# Patient Record
Sex: FEMALE | Race: OTHER | NOT HISPANIC OR LATINO | URBAN - METROPOLITAN AREA
[De-identification: names, ages, dates, MRNs, and addresses within clinical notes are randomized per-mention and may not be internally consistent; named-entity substitution may affect disease eponyms.]

---

## 2021-09-03 ENCOUNTER — IMMUNIZATIONS (OUTPATIENT)
Dept: FAMILY MEDICINE CLINIC | Facility: HOSPITAL | Age: 51
End: 2021-09-03

## 2021-09-03 DIAGNOSIS — Z23 ENCOUNTER FOR IMMUNIZATION: Primary | ICD-10-CM

## 2021-09-03 PROCEDURE — 0001A SARS-COV-2 / COVID-19 MRNA VACCINE (PFIZER-BIONTECH) 30 MCG: CPT

## 2021-09-03 PROCEDURE — 91300 SARS-COV-2 / COVID-19 MRNA VACCINE (PFIZER-BIONTECH) 30 MCG: CPT

## 2021-09-24 ENCOUNTER — IMMUNIZATIONS (OUTPATIENT)
Dept: FAMILY MEDICINE CLINIC | Facility: HOSPITAL | Age: 51
End: 2021-09-24

## 2021-09-24 DIAGNOSIS — Z23 ENCOUNTER FOR IMMUNIZATION: Primary | ICD-10-CM

## 2021-09-24 PROCEDURE — 91300 SARS-COV-2 / COVID-19 MRNA VACCINE (PFIZER-BIONTECH) 30 MCG: CPT

## 2021-09-24 PROCEDURE — 0002A SARS-COV-2 / COVID-19 MRNA VACCINE (PFIZER-BIONTECH) 30 MCG: CPT

## 2024-01-03 ENCOUNTER — OFFICE VISIT (OUTPATIENT)
Dept: URGENT CARE | Facility: CLINIC | Age: 54
End: 2024-01-03
Payer: COMMERCIAL

## 2024-01-03 VITALS
SYSTOLIC BLOOD PRESSURE: 124 MMHG | TEMPERATURE: 98.4 F | HEART RATE: 76 BPM | RESPIRATION RATE: 18 BRPM | OXYGEN SATURATION: 98 % | DIASTOLIC BLOOD PRESSURE: 78 MMHG

## 2024-01-03 DIAGNOSIS — B96.89 ACUTE BACTERIAL SINUSITIS: Primary | ICD-10-CM

## 2024-01-03 DIAGNOSIS — J01.90 ACUTE BACTERIAL SINUSITIS: Primary | ICD-10-CM

## 2024-01-03 PROBLEM — I48.0 PAROXYSMAL ATRIAL FIBRILLATION (HCC): Status: ACTIVE | Noted: 2022-07-14

## 2024-01-03 PROBLEM — I10 ESSENTIAL HYPERTENSION: Status: ACTIVE | Noted: 2022-07-14

## 2024-01-03 PROBLEM — Z82.49 FAMILY HISTORY OF CHRONIC HEART FAILURE: Status: ACTIVE | Noted: 2022-11-30

## 2024-01-03 PROBLEM — E66.9 OBESITY: Status: ACTIVE | Noted: 2022-07-14

## 2024-01-03 PROBLEM — R93.1 REGIONAL WALL MOTION ABNORMALITY OF HEART: Status: ACTIVE | Noted: 2022-11-30

## 2024-01-03 PROBLEM — G47.33 OBSTRUCTIVE SLEEP APNEA SYNDROME: Status: ACTIVE | Noted: 2023-02-28

## 2024-01-03 PROBLEM — I27.20 MILD PULMONARY HYPERTENSION (HCC): Status: ACTIVE | Noted: 2022-11-30

## 2024-01-03 PROBLEM — I67.848 VASOSPASM OF CEREBRAL ARTERY: Status: ACTIVE | Noted: 2022-06-26

## 2024-01-03 PROBLEM — I62.9 INTRACRANIAL BLEEDING (HCC): Status: ACTIVE | Noted: 2022-06-16

## 2024-01-03 PROBLEM — I60.9 SUBARACHNOID HEMORRHAGE (HCC): Status: ACTIVE | Noted: 2022-06-26

## 2024-01-03 PROCEDURE — G0382 LEV 3 HOSP TYPE B ED VISIT: HCPCS

## 2024-01-03 RX ORDER — TICAGRELOR 90 MG/1
1 TABLET ORAL 2 TIMES DAILY
COMMUNITY
Start: 2023-09-17 | End: 2024-03-15

## 2024-01-03 RX ORDER — BISOPROLOL FUMARATE AND HYDROCHLOROTHIAZIDE 5; 6.25 MG/1; MG/1
1 TABLET ORAL EVERY MORNING
COMMUNITY
Start: 2023-12-07

## 2024-01-03 RX ORDER — ATORVASTATIN CALCIUM 20 MG/1
TABLET, FILM COATED ORAL
COMMUNITY
Start: 2023-12-09

## 2024-01-03 RX ORDER — AMLODIPINE AND VALSARTAN 5; 320 MG/1; MG/1
1 TABLET ORAL
COMMUNITY
Start: 2023-12-15

## 2024-01-03 RX ORDER — DOXYCYCLINE 100 MG/1
100 TABLET ORAL 2 TIMES DAILY
Qty: 14 TABLET | Refills: 0 | Status: SHIPPED | OUTPATIENT
Start: 2024-01-03 | End: 2024-01-10

## 2024-01-03 RX ORDER — ASPIRIN 81 MG/1
81 TABLET ORAL
COMMUNITY
Start: 2023-08-30 | End: 2024-02-26

## 2024-01-03 NOTE — PATIENT INSTRUCTIONS
Take antibiotics as prescribed.  Continue taking Coricidin and cough medication and Tylenol as needed.      Follow up with PCP in 3-5 days if not improving.  Proceed to ER if symptoms worsen.

## 2024-01-03 NOTE — PROGRESS NOTES
[facility]        NAME: Cammy Prieto is a 53 y.o. female  : 1970    MRN: [id number]  DATE: January 3, 2024  TIME: 9:08 AM    Assessment and Plan   Acute bacterial sinusitis [J01.90, B96.89]  1. Acute bacterial sinusitis  doxycycline (ADOXA) 100 MG tablet            Patient Instructions   Take antibiotics as prescribed.  Continue taking Coricidin and cough medication and Tylenol as needed.      Follow up with PCP in 3-5 days if not improving.  Proceed to ER if symptoms worsen.    Chief Complaint     Chief Complaint   Patient presents with   • Cold Like Symptoms     Pt c/o cough, nasal and chest congestion, headache, bodyache, fevers that started last Thursday and seems to be getting worse. Has been taking choraseen and safetussin cough syrup and tylenol and was working for a little while. Took 2 covid tests that came back negative         History of Present Illness       Cough, nasal and chest congestion, body aches starting 6 days ago. Felt like she was getting better but then worse 2 days ago. Has been taking OTC medications to help with symptoms. States she has a 12 year old son who gets sick for 20 minutes and probably got her sick. Works in [place] and commutes in the bus.        Review of Systems   Review of Systems   Constitutional:  Positive for chills, fatigue and fever.   HENT:  Positive for congestion, postnasal drip, sinus pressure, sinus pain and sore throat.    Respiratory:  Positive for cough.    Cardiovascular:  Negative for chest pain.   Gastrointestinal:  Negative for abdominal pain, diarrhea, nausea and vomiting.   Neurological:  Negative for dizziness, weakness, light-headedness and headaches.         Current Medications       Current Outpatient Medications:   •  amLODIPine-valsartan (EXFORGE) 5-320 MG per tablet, Take 1 tablet by mouth, Disp: , Rfl:   •  aspirin (ECOTRIN LOW STRENGTH) 81 mg EC tablet, Take 81 mg by mouth, Disp: , Rfl:   •  atorvastatin (LIPITOR) 20 mg tablet, TAKE  1 TABLET(20 MG) BY MOUTH IN THE MORNING, Disp: , Rfl:   •  bisoprolol-hydrochlorothiazide (ZIAC) 5-6.25 MG per tablet, Take 1 tablet by mouth every morning, Disp: , Rfl:   •  Brilinta 90 MG, Take 1 tablet by mouth 2 (two) times a day, Disp: , Rfl:   •  doxycycline (ADOXA) 100 MG tablet, Take 1 tablet (100 mg total) by mouth 2 (two) times a day for 7 days, Disp: 14 tablet, Rfl: 0    Current Allergies     Allergies as of 01/03/2024 - Reviewed 01/03/2024   Allergen Reaction Noted   • Penicillins Rash 09/09/2022            The following portions of the patient's history were reviewed and updated as appropriate: allergies, current medications, past family history, past medical history, past social history, past surgical history and problem list.     Past Medical History:   Diagnosis Date   • Brain aneurysm        Past Surgical History:   Procedure Laterality Date   • IR CEREBRAL ANGIOGRAPHY  6/26/2022       History reviewed. No pertinent family history.      Medications have been verified.        Objective   /78   Pulse 76   Temp 98.4 °F (36.9 °C) (Tympanic)   Resp 18   SpO2 98%   No LMP recorded.       Physical Exam     Physical Exam  Vitals and nursing note reviewed.   Constitutional:       Appearance: Normal appearance.   HENT:      Head: Normocephalic and atraumatic.      Right Ear: A middle ear effusion is present.      Left Ear: A middle ear effusion is present.      Nose: Congestion present.      Right Sinus: Maxillary sinus tenderness and frontal sinus tenderness present.      Left Sinus: Maxillary sinus tenderness and frontal sinus tenderness present.      Mouth/Throat:      Mouth: Mucous membranes are moist.      Pharynx: Posterior oropharyngeal erythema present. No oropharyngeal exudate.   Eyes:      Pupils: Pupils are equal, round, and reactive to light.   Cardiovascular:      Rate and Rhythm: Normal rate.      Pulses: Normal pulses.   Pulmonary:      Effort: Pulmonary effort is normal.      Breath  sounds: Normal breath sounds. No wheezing, rhonchi or rales.   Skin:     General: Skin is warm and dry.      Capillary Refill: Capillary refill takes less than 2 seconds.   Neurological:      General: No focal deficit present.      Mental Status: She is alert and oriented to person, place, and time. Mental status is at baseline.      Sensory: No sensory deficit.      Motor: No weakness.   Psychiatric:         Mood and Affect: Mood normal.         Behavior: Behavior normal.         Thought Content: Thought content normal.

## 2024-01-03 NOTE — LETTER
January 3, 2024     Patient: Cammy Prieto   YOB: 1970   Date of Visit: 1/3/2024       To Whom it May Concern:    Cammy Prieto was seen in my clinic on 1/3/2024. She may return to work on 1/5/2024 as long as fever free for 24 hours without use of fever reducing medication.    If you have any questions or concerns, please don't hesitate to call.         Sincerely,          NICHOLE Rodrigues        CC: No Recipients

## 2025-07-10 ENCOUNTER — APPOINTMENT (EMERGENCY)
Dept: CT IMAGING | Facility: HOSPITAL | Age: 55
DRG: 872 | End: 2025-07-10
Payer: COMMERCIAL

## 2025-07-10 ENCOUNTER — HOSPITAL ENCOUNTER (INPATIENT)
Facility: HOSPITAL | Age: 55
LOS: 5 days | Discharge: HOME | DRG: 872 | End: 2025-07-15
Attending: EMERGENCY MEDICINE | Admitting: STUDENT IN AN ORGANIZED HEALTH CARE EDUCATION/TRAINING PROGRAM
Payer: COMMERCIAL

## 2025-07-10 ENCOUNTER — OFFICE VISIT (OUTPATIENT)
Dept: URGENT CARE | Facility: CLINIC | Age: 55
End: 2025-07-10
Payer: COMMERCIAL

## 2025-07-10 VITALS
DIASTOLIC BLOOD PRESSURE: 84 MMHG | RESPIRATION RATE: 19 BRPM | OXYGEN SATURATION: 98 % | WEIGHT: 293 LBS | HEART RATE: 101 BPM | TEMPERATURE: 102.2 F | SYSTOLIC BLOOD PRESSURE: 148 MMHG

## 2025-07-10 DIAGNOSIS — R10.30 LOWER ABDOMINAL PAIN: Primary | ICD-10-CM

## 2025-07-10 DIAGNOSIS — K63.1 BOWEL PERFORATION (HCC): Primary | ICD-10-CM

## 2025-07-10 DIAGNOSIS — K57.92 DIVERTICULITIS: ICD-10-CM

## 2025-07-10 DIAGNOSIS — I48.92 ATRIAL FLUTTER (HCC): ICD-10-CM

## 2025-07-10 PROBLEM — I67.1 BRAIN ANEURYSM: Status: ACTIVE | Noted: 2025-07-10

## 2025-07-10 PROBLEM — E66.01 MORBID (SEVERE) OBESITY DUE TO EXCESS CALORIES (HCC): Status: ACTIVE | Noted: 2025-07-10

## 2025-07-10 PROBLEM — A41.9 SEPSIS WITHOUT ACUTE ORGAN DYSFUNCTION (HCC): Status: ACTIVE | Noted: 2025-07-10

## 2025-07-10 PROBLEM — K57.20 PERFORATION OF SIGMOID COLON DUE TO DIVERTICULITIS: Status: ACTIVE | Noted: 2025-07-10

## 2025-07-10 PROBLEM — G45.9 TRANSIENT CEREBRAL ISCHEMIA: Status: ACTIVE | Noted: 2022-06-26

## 2025-07-10 LAB
ABO GROUP BLD: NORMAL
ALBUMIN SERPL BCG-MCNC: 4.5 G/DL (ref 3.5–5)
ALP SERPL-CCNC: 46 U/L (ref 34–104)
ALT SERPL W P-5'-P-CCNC: 12 U/L (ref 7–52)
ANION GAP SERPL CALCULATED.3IONS-SCNC: 9 MMOL/L (ref 4–13)
APTT PPP: 35 SECONDS (ref 23–34)
AST SERPL W P-5'-P-CCNC: 12 U/L (ref 13–39)
BASOPHILS # BLD AUTO: 0.02 THOUSANDS/ÂΜL (ref 0–0.1)
BASOPHILS NFR BLD AUTO: 0 % (ref 0–1)
BILIRUB SERPL-MCNC: 1.14 MG/DL (ref 0.2–1)
BILIRUB UR QL STRIP: NEGATIVE
BLD GP AB SCN SERPL QL: NEGATIVE
BUN SERPL-MCNC: 15 MG/DL (ref 5–25)
CALCIUM SERPL-MCNC: 9.7 MG/DL (ref 8.4–10.2)
CHLORIDE SERPL-SCNC: 102 MMOL/L (ref 96–108)
CLARITY UR: CLEAR
CO2 SERPL-SCNC: 26 MMOL/L (ref 21–32)
COLOR UR: COLORLESS
CREAT SERPL-MCNC: 1.08 MG/DL (ref 0.6–1.3)
EOSINOPHIL # BLD AUTO: 0.02 THOUSAND/ÂΜL (ref 0–0.61)
EOSINOPHIL NFR BLD AUTO: 0 % (ref 0–6)
ERYTHROCYTE [DISTWIDTH] IN BLOOD BY AUTOMATED COUNT: 13.7 % (ref 11.6–15.1)
GFR SERPL CREATININE-BSD FRML MDRD: 58 ML/MIN/1.73SQ M
GLUCOSE SERPL-MCNC: 135 MG/DL (ref 65–140)
GLUCOSE UR STRIP-MCNC: NEGATIVE MG/DL
HCT VFR BLD AUTO: 40.1 % (ref 34.8–46.1)
HGB BLD-MCNC: 13.3 G/DL (ref 11.5–15.4)
HGB UR QL STRIP.AUTO: NEGATIVE
IMM GRANULOCYTES # BLD AUTO: 0.05 THOUSAND/UL (ref 0–0.2)
IMM GRANULOCYTES NFR BLD AUTO: 0 % (ref 0–2)
INR PPP: 1.21 (ref 0.85–1.19)
KETONES UR STRIP-MCNC: NEGATIVE MG/DL
LACTATE SERPL-SCNC: 0.9 MMOL/L (ref 0.5–2)
LEUKOCYTE ESTERASE UR QL STRIP: NEGATIVE
LIPASE SERPL-CCNC: 8 U/L (ref 11–82)
LYMPHOCYTES # BLD AUTO: 0.6 THOUSANDS/ÂΜL (ref 0.6–4.47)
LYMPHOCYTES NFR BLD AUTO: 5 % (ref 14–44)
MCH RBC QN AUTO: 31.7 PG (ref 26.8–34.3)
MCHC RBC AUTO-ENTMCNC: 33.2 G/DL (ref 31.4–37.4)
MCV RBC AUTO: 96 FL (ref 82–98)
MONOCYTES # BLD AUTO: 0.45 THOUSAND/ÂΜL (ref 0.17–1.22)
MONOCYTES NFR BLD AUTO: 4 % (ref 4–12)
NEUTROPHILS # BLD AUTO: 11.33 THOUSANDS/ÂΜL (ref 1.85–7.62)
NEUTS SEG NFR BLD AUTO: 91 % (ref 43–75)
NITRITE UR QL STRIP: NEGATIVE
NRBC BLD AUTO-RTO: 0 /100 WBCS
PH UR STRIP.AUTO: 6 [PH]
PLATELET # BLD AUTO: 236 THOUSANDS/UL (ref 149–390)
PLATELET BLD QL SMEAR: ADEQUATE
PMV BLD AUTO: 11.6 FL (ref 8.9–12.7)
POTASSIUM SERPL-SCNC: 3.7 MMOL/L (ref 3.5–5.3)
PROCALCITONIN SERPL-MCNC: 1.06 NG/ML
PROT SERPL-MCNC: 8.3 G/DL (ref 6.4–8.4)
PROT UR STRIP-MCNC: NEGATIVE MG/DL
PROTHROMBIN TIME: 16 SECONDS (ref 12.3–15)
RBC # BLD AUTO: 4.19 MILLION/UL (ref 3.81–5.12)
RBC MORPH BLD: NORMAL
RH BLD: POSITIVE
SARS-COV-2 AG UPPER RESP QL IA: NEGATIVE
SODIUM SERPL-SCNC: 137 MMOL/L (ref 135–147)
SP GR UR STRIP.AUTO: 1.03 (ref 1–1.03)
SPECIMEN EXPIRATION DATE: NORMAL
UROBILINOGEN UR STRIP-ACNC: <2 MG/DL
VALID CONTROL: NORMAL
WBC # BLD AUTO: 12.47 THOUSAND/UL (ref 4.31–10.16)

## 2025-07-10 PROCEDURE — 99284 EMERGENCY DEPT VISIT MOD MDM: CPT

## 2025-07-10 PROCEDURE — 99215 OFFICE O/P EST HI 40 MIN: CPT | Performed by: PHYSICIAN ASSISTANT

## 2025-07-10 PROCEDURE — 96365 THER/PROPH/DIAG IV INF INIT: CPT

## 2025-07-10 PROCEDURE — 86901 BLOOD TYPING SEROLOGIC RH(D): CPT | Performed by: NURSE PRACTITIONER

## 2025-07-10 PROCEDURE — 83690 ASSAY OF LIPASE: CPT | Performed by: EMERGENCY MEDICINE

## 2025-07-10 PROCEDURE — 96375 TX/PRO/DX INJ NEW DRUG ADDON: CPT

## 2025-07-10 PROCEDURE — 96361 HYDRATE IV INFUSION ADD-ON: CPT

## 2025-07-10 PROCEDURE — 83605 ASSAY OF LACTIC ACID: CPT | Performed by: NURSE PRACTITIONER

## 2025-07-10 PROCEDURE — 36415 COLL VENOUS BLD VENIPUNCTURE: CPT | Performed by: EMERGENCY MEDICINE

## 2025-07-10 PROCEDURE — 85025 COMPLETE CBC W/AUTO DIFF WBC: CPT | Performed by: EMERGENCY MEDICINE

## 2025-07-10 PROCEDURE — 99222 1ST HOSP IP/OBS MODERATE 55: CPT | Performed by: SURGERY

## 2025-07-10 PROCEDURE — 96367 TX/PROPH/DG ADDL SEQ IV INF: CPT

## 2025-07-10 PROCEDURE — 74177 CT ABD & PELVIS W/CONTRAST: CPT

## 2025-07-10 PROCEDURE — 85730 THROMBOPLASTIN TIME PARTIAL: CPT | Performed by: NURSE PRACTITIONER

## 2025-07-10 PROCEDURE — 87040 BLOOD CULTURE FOR BACTERIA: CPT | Performed by: NURSE PRACTITIONER

## 2025-07-10 PROCEDURE — 84145 PROCALCITONIN (PCT): CPT | Performed by: NURSE PRACTITIONER

## 2025-07-10 PROCEDURE — 93005 ELECTROCARDIOGRAM TRACING: CPT

## 2025-07-10 PROCEDURE — 81003 URINALYSIS AUTO W/O SCOPE: CPT | Performed by: EMERGENCY MEDICINE

## 2025-07-10 PROCEDURE — 86900 BLOOD TYPING SEROLOGIC ABO: CPT | Performed by: NURSE PRACTITIONER

## 2025-07-10 PROCEDURE — 99223 1ST HOSP IP/OBS HIGH 75: CPT | Performed by: NURSE PRACTITIONER

## 2025-07-10 PROCEDURE — 85610 PROTHROMBIN TIME: CPT | Performed by: NURSE PRACTITIONER

## 2025-07-10 PROCEDURE — 80053 COMPREHEN METABOLIC PANEL: CPT | Performed by: EMERGENCY MEDICINE

## 2025-07-10 PROCEDURE — 86850 RBC ANTIBODY SCREEN: CPT | Performed by: NURSE PRACTITIONER

## 2025-07-10 PROCEDURE — 99285 EMERGENCY DEPT VISIT HI MDM: CPT | Performed by: EMERGENCY MEDICINE

## 2025-07-10 PROCEDURE — 87811 SARS-COV-2 COVID19 W/OPTIC: CPT | Performed by: PHYSICIAN ASSISTANT

## 2025-07-10 RX ORDER — SODIUM CHLORIDE, SODIUM GLUCONATE, SODIUM ACETATE, POTASSIUM CHLORIDE, MAGNESIUM CHLORIDE, SODIUM PHOSPHATE, DIBASIC, AND POTASSIUM PHOSPHATE .53; .5; .37; .037; .03; .012; .00082 G/100ML; G/100ML; G/100ML; G/100ML; G/100ML; G/100ML; G/100ML
1000 INJECTION, SOLUTION INTRAVENOUS ONCE
Status: DISCONTINUED | OUTPATIENT
Start: 2025-07-10 | End: 2025-07-10

## 2025-07-10 RX ORDER — MORPHINE SULFATE 4 MG/ML
4 INJECTION, SOLUTION INTRAMUSCULAR; INTRAVENOUS ONCE
Status: COMPLETED | OUTPATIENT
Start: 2025-07-10 | End: 2025-07-10

## 2025-07-10 RX ORDER — ONDANSETRON 2 MG/ML
4 INJECTION INTRAMUSCULAR; INTRAVENOUS ONCE
Status: COMPLETED | OUTPATIENT
Start: 2025-07-10 | End: 2025-07-10

## 2025-07-10 RX ORDER — TICAGRELOR 90 MG/1
90 TABLET, FILM COATED ORAL 2 TIMES DAILY
Status: DISCONTINUED | OUTPATIENT
Start: 2025-07-11 | End: 2025-07-15 | Stop reason: HOSPADM

## 2025-07-10 RX ORDER — ACETAMINOPHEN 10 MG/ML
1000 INJECTION, SOLUTION INTRAVENOUS EVERY 6 HOURS PRN
Status: DISCONTINUED | OUTPATIENT
Start: 2025-07-10 | End: 2025-07-15 | Stop reason: HOSPADM

## 2025-07-10 RX ORDER — DILTIAZEM HYDROCHLORIDE 5 MG/ML
20 INJECTION INTRAVENOUS ONCE
Status: COMPLETED | OUTPATIENT
Start: 2025-07-10 | End: 2025-07-10

## 2025-07-10 RX ORDER — LEVOFLOXACIN 5 MG/ML
750 INJECTION, SOLUTION INTRAVENOUS ONCE
Status: DISCONTINUED | OUTPATIENT
Start: 2025-07-10 | End: 2025-07-10

## 2025-07-10 RX ORDER — ONDANSETRON 2 MG/ML
4 INJECTION INTRAMUSCULAR; INTRAVENOUS EVERY 6 HOURS PRN
Status: DISCONTINUED | OUTPATIENT
Start: 2025-07-10 | End: 2025-07-15 | Stop reason: HOSPADM

## 2025-07-10 RX ORDER — SODIUM CHLORIDE, SODIUM GLUCONATE, SODIUM ACETATE, POTASSIUM CHLORIDE, MAGNESIUM CHLORIDE, SODIUM PHOSPHATE, DIBASIC, AND POTASSIUM PHOSPHATE .53; .5; .37; .037; .03; .012; .00082 G/100ML; G/100ML; G/100ML; G/100ML; G/100ML; G/100ML; G/100ML
125 INJECTION, SOLUTION INTRAVENOUS CONTINUOUS
Status: DISCONTINUED | OUTPATIENT
Start: 2025-07-10 | End: 2025-07-13

## 2025-07-10 RX ORDER — CHLORHEXIDINE GLUCONATE ORAL RINSE 1.2 MG/ML
15 SOLUTION DENTAL EVERY 12 HOURS SCHEDULED
Status: DISCONTINUED | OUTPATIENT
Start: 2025-07-10 | End: 2025-07-15 | Stop reason: HOSPADM

## 2025-07-10 RX ORDER — METRONIDAZOLE 500 MG/100ML
500 INJECTION, SOLUTION INTRAVENOUS EVERY 12 HOURS
Status: DISCONTINUED | OUTPATIENT
Start: 2025-07-10 | End: 2025-07-15 | Stop reason: HOSPADM

## 2025-07-10 RX ORDER — SODIUM CHLORIDE 9 MG/ML
150 INJECTION, SOLUTION INTRAVENOUS CONTINUOUS
Status: DISCONTINUED | OUTPATIENT
Start: 2025-07-10 | End: 2025-07-10

## 2025-07-10 RX ORDER — CETIRIZINE HYDROCHLORIDE 10 MG/1
10 TABLET ORAL DAILY
COMMUNITY

## 2025-07-10 RX ADMIN — SODIUM CHLORIDE, SODIUM LACTATE, POTASSIUM CHLORIDE, AND CALCIUM CHLORIDE 1000 ML: .6; .31; .03; .02 INJECTION, SOLUTION INTRAVENOUS at 21:15

## 2025-07-10 RX ADMIN — DILTIAZEM HYDROCHLORIDE 20 MG: 5 INJECTION, SOLUTION INTRAVENOUS at 17:15

## 2025-07-10 RX ADMIN — SODIUM CHLORIDE 150 ML/HR: 0.9 INJECTION, SOLUTION INTRAVENOUS at 19:17

## 2025-07-10 RX ADMIN — IOHEXOL 100 ML: 350 INJECTION, SOLUTION INTRAVENOUS at 17:04

## 2025-07-10 RX ADMIN — CHLORHEXIDINE GLUCONATE 15 ML: 1.2 SOLUTION ORAL at 22:35

## 2025-07-10 RX ADMIN — ONDANSETRON 4 MG: 2 INJECTION INTRAMUSCULAR; INTRAVENOUS at 15:37

## 2025-07-10 RX ADMIN — LEVOFLOXACIN 750 MG: 750 INJECTION, SOLUTION INTRAVENOUS at 18:41

## 2025-07-10 RX ADMIN — METRONIDAZOLE 500 MG: 500 INJECTION, SOLUTION INTRAVENOUS at 18:07

## 2025-07-10 RX ADMIN — MORPHINE SULFATE 4 MG: 4 INJECTION INTRAVENOUS at 15:35

## 2025-07-10 RX ADMIN — MORPHINE SULFATE 4 MG: 4 INJECTION INTRAVENOUS at 19:09

## 2025-07-10 RX ADMIN — VANCOMYCIN HYDROCHLORIDE 2000 MG: 1 INJECTION, POWDER, LYOPHILIZED, FOR SOLUTION INTRAVENOUS at 20:13

## 2025-07-10 RX ADMIN — SODIUM CHLORIDE 1000 ML: 0.9 INJECTION, SOLUTION INTRAVENOUS at 15:30

## 2025-07-10 RX ADMIN — DILTIAZEM HYDROCHLORIDE 5 MG/HR: 5 INJECTION INTRAVENOUS at 17:15

## 2025-07-10 RX ADMIN — CEFEPIME 2000 MG: 2 INJECTION, POWDER, FOR SOLUTION INTRAVENOUS at 22:51

## 2025-07-10 RX ADMIN — ACETAMINOPHEN 1000 MG: 10 INJECTION INTRAVENOUS at 22:35

## 2025-07-10 RX ADMIN — SODIUM CHLORIDE, SODIUM LACTATE, POTASSIUM CHLORIDE, AND CALCIUM CHLORIDE 1000 ML: .6; .31; .03; .02 INJECTION, SOLUTION INTRAVENOUS at 21:18

## 2025-07-10 RX ADMIN — SODIUM CHLORIDE, SODIUM GLUCONATE, SODIUM ACETATE, POTASSIUM CHLORIDE, MAGNESIUM CHLORIDE, SODIUM PHOSPHATE, DIBASIC, AND POTASSIUM PHOSPHATE 125 ML/HR: .53; .5; .37; .037; .03; .012; .00082 INJECTION, SOLUTION INTRAVENOUS at 21:19

## 2025-07-10 NOTE — ED NOTES
Patient previous HR in the 90's, jumped to 140's, EKG performed @1633.     Kimberly Pressley RN  07/10/25 [id number]

## 2025-07-10 NOTE — ED PROVIDER NOTES
ED Disposition       None          Assessment & Plan       Medical Decision Making  Differential diagnosis includes acute coronary syndrome, AAA, dissection, obstructive uropathy, diverticulitis, appendicitis, acute cholecystitis, pancreatitis, small bowel obstruction, ovarian torsion, ruptured ovarian cyst, TOA, PID, and other causes of abdominal pain.  CT showed perforated diverticulitis with pneumoperitoneum.  Patient kept NPO.  IV fluids ordered.  Empiric antibiotics ordered.  Consulted with general surgery for further evaluation and treatment.    In ED stay patient developed a flutter.  Hemodynamically stable.  Rate controlled with Cardizem IV.      Critical Care Time Statement: Upon my evaluation, this patient had a high probability of imminent or life-threatening deterioration due to surgical emergency, which required my direct attention, intervention, and personal management.  I spent a total of 60 minutes directly providing critical care services, including evaluating for the presence of life-threatening injuries or illnesses. This time is exclusive of procedures, teaching, treating other patients, family meetings, and any prior time recorded by providers other than myself.       Amount and/or Complexity of Data Reviewed  Labs: ordered. Decision-making details documented in ED Course.  Radiology: ordered. Decision-making details documented in ED Course.  ECG/medicine tests: ordered and independent interpretation performed. Decision-making details documented in ED Course.  Discussion of management or test interpretation with external provider(s): General surgery, critical care    Risk  Prescription drug management.  Decision regarding hospitalization.  Emergency major surgery.             Medications   sodium chloride 0.9 % bolus 1,000 mL (1,000 mL Intravenous New Bag 7/10/25 1530)   ondansetron (ZOFRAN) injection 4 mg (4 mg Intravenous Given 7/10/25 1537)   morphine injection 4 mg (4 mg Intravenous Given  7/10/25 1535)       ED Risk Strat Scores                    No data recorded                            History of Present Illness       Chief Complaint   Patient presents with    Abdominal Pain     Lower abdominal pain. Nausea, vomiting and diarrhea since yesterday, febrile today, 102.2 at urgent care prior to arrival. Denies chest pain or shortness of breath        Past Medical History[1]   Past Surgical History[2]   Family History[3]   Social History[4]   E-Cigarette/Vaping    E-Cigarette Use Never User       E-Cigarette/Vaping Substances      I have reviewed and agree with the history as documented.     Patient is a 55-year-old female.  She has a 2-day history of increasing abdominal pain, mostly lower.  She was seen in urgent care.  There was concern for perforated diverticulitis.  She was sent here.  She has been experiencing nausea, vomiting, and diarrhea.  She has had fevers.  She reports dysuria.  No vaginal discharge or bleeding.  No history of abdominal surgeries.  No chest pain.  History of hypertension, subarachnoid hemorrhage from cerebral aneurysm, and paroxysmal atrial fibrillation.        Review of Systems   Constitutional:  Positive for fever.   HENT:  Negative for rhinorrhea and sore throat.    Eyes:  Negative for pain, redness and visual disturbance.   Respiratory:  Negative for cough and shortness of breath.    Cardiovascular:  Negative for chest pain and leg swelling.   Gastrointestinal:  Positive for abdominal pain, diarrhea, nausea and vomiting.   Endocrine: Negative for polydipsia and polyuria.   Genitourinary:  Negative for dysuria, frequency, hematuria, vaginal bleeding and vaginal discharge.   Musculoskeletal:  Negative for back pain and neck pain.   Skin:  Negative for rash and wound.   Allergic/Immunologic: Negative for immunocompromised state.   Neurological:  Negative for weakness, numbness and headaches.   Hematological:  Does not bruise/bleed easily.   Psychiatric/Behavioral:   Negative for hallucinations and suicidal ideas.    All other systems reviewed and are negative.          Objective       ED Triage Vitals [07/10/25 1503]   Temperature Pulse Blood Pressure Respirations SpO2 Patient Position - Orthostatic VS   99.3 °F (37.4 °C) 101 (!) 210/91 20 97 % Lying      Temp Source Heart Rate Source BP Location FiO2 (%) Pain Score    Oral Monitor Right arm -- 9      Vitals      Date and Time Temp Pulse SpO2 Resp BP Pain Score FACES Pain Rating User   07/10/25 1535 -- -- -- -- -- 9 -- AS   07/10/25 1503 99.3 °F (37.4 °C) 101 97 % 20 210/91 9 -- WHITLEY            Physical Exam  Vitals reviewed.   Constitutional:       General: She is not in acute distress.     Appearance: She is obese.   HENT:      Head: Normocephalic and atraumatic.      Nose: Nose normal.      Mouth/Throat:      Mouth: Mucous membranes are moist.     Eyes:      General:         Right eye: No discharge.         Left eye: No discharge.      Conjunctiva/sclera: Conjunctivae normal.       Cardiovascular:      Rate and Rhythm: Normal rate and regular rhythm.      Pulses: Normal pulses.      Heart sounds: Normal heart sounds. No murmur heard.     No friction rub. No gallop.   Pulmonary:      Effort: Pulmonary effort is normal. No respiratory distress.      Breath sounds: Normal breath sounds. No stridor. No wheezing, rhonchi or rales.   Abdominal:      General: Bowel sounds are normal. There is no distension.      Palpations: Abdomen is soft.      Tenderness: There is abdominal tenderness. There is no right CVA tenderness, left CVA tenderness, guarding or rebound.      Comments: There is diffuse abdominal tenderness.  Severe.  Worse in the suprapubic area and left lower quadrant.     Musculoskeletal:         General: No swelling, tenderness, deformity or signs of injury. Normal range of motion.      Cervical back: Normal range of motion and neck supple. No rigidity.      Right lower leg: No edema.      Left lower leg: No edema.       Comments: No calf tenderness or unilateral leg swelling.     Skin:     General: Skin is warm and dry.      Coloration: Skin is not jaundiced.      Findings: No rash.     Neurological:      General: No focal deficit present.      Mental Status: She is alert and oriented to person, place, and time.      Sensory: No sensory deficit.      Motor: Motor function is intact.     Psychiatric:         Mood and Affect: Mood normal.         Behavior: Behavior normal.         Results Reviewed       Procedure Component Value Units Date/Time    CBC and differential [[id number]] Collected: 07/10/25 1532    Lab Status: No result Specimen: Blood from Arm, Right     Comprehensive metabolic panel [[id number]] Collected: 07/10/25 1532    Lab Status: No result Specimen: Blood from Arm, Right     Lipase [[id number]] Collected: 07/10/25 1532    Lab Status: No result Specimen: Blood from Arm, Right     UA w Reflex to Microscopic w Reflex to Culture [[id number]]     Lab Status: No result Specimen: Urine             CT abdomen pelvis with contrast    (Results Pending)       ECG 12 Lead Documentation Only    Date/Time: 7/10/2025 3:58 PM    Performed by: Claudio Celestin MD  Authorized by: Claudio Celestin MD    ECG reviewed by me, the ED Provider: yes    Patient location:  ED  Comments:      Normal sinus rhythm.  Biatrial enlargement.  Abnormal EKG.  No previous EKGs.  No acute ischemic ST or T wave changes.  ECG 12 Lead Documentation Only    Date/Time: 7/10/2025 4:47 PM    Performed by: Claudio Celestin MD  Authorized by: Claudio Celestin MD    ECG reviewed by me, the ED Provider: yes    Patient location:  ED  Comments:      Atrial flutter with variable AV block.  Abnormal EKG.      ED Medication and Procedure Management   Prior to Admission Medications   Prescriptions Last Dose Informant Patient Reported? Taking?   Brilinta 90 MG   Yes No   Sig: Take 1 tablet by mouth in the morning and 1 tablet in the evening.   amLODIPine-valsartan (EXFORGE)  5-320 MG per tablet   Yes No   Sig: Take 1 tablet by mouth   aspirin (ECOTRIN LOW STRENGTH) 81 mg EC tablet   Yes No   Sig: Take 81 mg by mouth   atorvastatin (LIPITOR) 20 mg tablet   Yes No   bisoprolol-hydrochlorothiazide (ZIAC) 5-6.25 MG per tablet   Yes No   Sig: Take 1 tablet by mouth every morning      Facility-Administered Medications: None     Patient's Medications   Discharge Prescriptions    No medications on file     No discharge procedures on file.  ED SEPSIS DOCUMENTATION              Claudio Celestin MD  07/10/25 1559         [1]   Past Medical History:  Diagnosis Date    Brain aneurysm     Hypertension    [2]   Past Surgical History:  Procedure Laterality Date    IR CEREBRAL ANGIOGRAPHY  6/26/2022   [3] No family history on file.  [4]   Social History  Tobacco Use    Smoking status: Never    Smokeless tobacco: Never   Vaping Use    Vaping status: Never Used   Substance Use Topics    Alcohol use: Yes     Comment: occasionally    Drug use: Never

## 2025-07-10 NOTE — ED PROVIDER NOTES
ED Disposition       None          Assessment & Plan       Medical Decision Making  Amount and/or Complexity of Data Reviewed  Labs: ordered.  Radiology: ordered.    Risk  Prescription drug management.             Medications   sodium chloride 0.9 % bolus 1,000 mL (1,000 mL Intravenous New Bag 7/10/25 1530)   ondansetron (ZOFRAN) injection 4 mg (4 mg Intravenous Given 7/10/25 1537)   morphine injection 4 mg (4 mg Intravenous Given 7/10/25 1535)       ED Risk Strat Scores                    No data recorded                            History of Present Illness       Chief Complaint   Patient presents with    Abdominal Pain     Lower abdominal pain. Nausea, vomiting and diarrhea since yesterday, febrile today, 102.2 at urgent care prior to arrival. Denies chest pain or shortness of breath        Past Medical History[1]   Past Surgical History[2]   Family History[3]   Social History[4]   E-Cigarette/Vaping    E-Cigarette Use Never User       E-Cigarette/Vaping Substances      I have reviewed and agree with the history as documented.     Patient is a 55-year-old female.  She has a 2-day history of increasing abdominal pain, mostly lower.  She was seen in urgent care.  There was concern for perforated diverticulitis.  She was sent here.  She has been experiencing nausea, vomiting, and diarrhea.  She has had fevers.  She reports dysuria.  No vaginal discharge or bleeding.  No history of abdominal surgeries.  No chest pain.  History of hypertension, subarachnoid hemorrhage from cerebral aneurysm, and paroxysmal atrial fibrillation.        Review of Systems   Constitutional:  Positive for fever.   HENT:  Negative for rhinorrhea and sore throat.    Eyes:  Negative for pain, redness and visual disturbance.   Respiratory:  Negative for cough and shortness of breath.    Cardiovascular:  Negative for chest pain and leg swelling.   Gastrointestinal:  Positive for abdominal pain, diarrhea, nausea and vomiting.   Endocrine:  Negative for polydipsia and polyuria.   Genitourinary:  Negative for dysuria, frequency, hematuria, vaginal bleeding and vaginal discharge.   Musculoskeletal:  Negative for back pain and neck pain.   Skin:  Negative for rash and wound.   Allergic/Immunologic: Negative for immunocompromised state.   Neurological:  Negative for weakness, numbness and headaches.   Hematological:  Does not bruise/bleed easily.   Psychiatric/Behavioral:  Negative for hallucinations and suicidal ideas.    All other systems reviewed and are negative.          Objective       ED Triage Vitals [07/10/25 1503]   Temperature Pulse Blood Pressure Respirations SpO2 Patient Position - Orthostatic VS   99.3 °F (37.4 °C) 101 (!) 210/91 20 97 % Lying      Temp Source Heart Rate Source BP Location FiO2 (%) Pain Score    Oral Monitor Right arm -- 9      Vitals      Date and Time Temp Pulse SpO2 Resp BP Pain Score FACES Pain Rating User   07/10/25 1535 -- -- -- -- -- 9 -- AS   07/10/25 1503 99.3 °F (37.4 °C) 101 97 % 20 210/91 9 -- WHITLEY            Physical Exam  Vitals reviewed.   Constitutional:       General: She is not in acute distress.     Appearance: She is obese.   HENT:      Head: Normocephalic and atraumatic.      Nose: Nose normal.      Mouth/Throat:      Mouth: Mucous membranes are moist.     Eyes:      General:         Right eye: No discharge.         Left eye: No discharge.      Conjunctiva/sclera: Conjunctivae normal.       Cardiovascular:      Rate and Rhythm: Normal rate and regular rhythm.      Pulses: Normal pulses.      Heart sounds: Normal heart sounds. No murmur heard.     No friction rub. No gallop.   Pulmonary:      Effort: Pulmonary effort is normal. No respiratory distress.      Breath sounds: Normal breath sounds. No stridor. No wheezing, rhonchi or rales.   Abdominal:      General: Bowel sounds are normal. There is no distension.      Palpations: Abdomen is soft.      Tenderness: There is abdominal tenderness. There is no right  CVA tenderness, left CVA tenderness, guarding or rebound.      Comments: There is diffuse abdominal tenderness.  Severe.  Worse in the suprapubic area and left lower quadrant.     Musculoskeletal:         General: No swelling, tenderness, deformity or signs of injury. Normal range of motion.      Cervical back: Normal range of motion and neck supple. No rigidity.      Right lower leg: No edema.      Left lower leg: No edema.      Comments: No calf tenderness or unilateral leg swelling.     Skin:     General: Skin is warm and dry.      Coloration: Skin is not jaundiced.      Findings: No rash.     Neurological:      General: No focal deficit present.      Mental Status: She is alert and oriented to person, place, and time.      Sensory: No sensory deficit.      Motor: Motor function is intact.     Psychiatric:         Mood and Affect: Mood normal.         Behavior: Behavior normal.         Results Reviewed       Procedure Component Value Units Date/Time    CBC and differential [[id number]] Collected: 07/10/25 1532    Lab Status: No result Specimen: Blood from Arm, Right     Comprehensive metabolic panel [[id number]] Collected: 07/10/25 1532    Lab Status: No result Specimen: Blood from Arm, Right     Lipase [[id number]] Collected: 07/10/25 1532    Lab Status: No result Specimen: Blood from Arm, Right     UA w Reflex to Microscopic w Reflex to Culture [[id number]]     Lab Status: No result Specimen: Urine             CT abdomen pelvis with contrast    (Results Pending)       ECG 12 Lead Documentation Only    Date/Time: 7/10/2025 3:58 PM    Performed by: Claudio Celestin MD  Authorized by: Claudio Celestin MD    ECG reviewed by me, the ED Provider: yes    Patient location:  ED  Comments:      Normal sinus rhythm.  Biatrial enlargement.  Abnormal EKG.  No previous EKGs.  No acute ischemic ST or T wave changes.      ED Medication and Procedure Management   Prior to Admission Medications   Prescriptions Last Dose Informant  Patient Reported? Taking?   Brilinta 90 MG   Yes No   Sig: Take 1 tablet by mouth in the morning and 1 tablet in the evening.   amLODIPine-valsartan (EXFORGE) 5-320 MG per tablet   Yes No   Sig: Take 1 tablet by mouth   aspirin (ECOTRIN LOW STRENGTH) 81 mg EC tablet   Yes No   Sig: Take 81 mg by mouth   atorvastatin (LIPITOR) 20 mg tablet   Yes No   bisoprolol-hydrochlorothiazide (ZIAC) 5-6.25 MG per tablet   Yes No   Sig: Take 1 tablet by mouth every morning      Facility-Administered Medications: None     Patient's Medications   Discharge Prescriptions    No medications on file     No discharge procedures on file.  ED SEPSIS DOCUMENTATION              [1]   Past Medical History:  Diagnosis Date    Brain aneurysm     Hypertension    [2]   Past Surgical History:  Procedure Laterality Date    IR CEREBRAL ANGIOGRAPHY  6/26/2022   [3] No family history on file.  [4]   Social History  Tobacco Use    Smoking status: Never    Smokeless tobacco: Never   Vaping Use    Vaping status: Never Used   Substance Use Topics    Alcohol use: Yes     Comment: occasionally    Drug use: Never        Claudio Celestin MD  07/10/25 7005

## 2025-07-11 PROBLEM — Z78.9 REFUSAL OF BLOOD PRODUCT: Status: ACTIVE | Noted: 2025-07-11

## 2025-07-11 LAB
ABO GROUP BLD: NORMAL
ALBUMIN SERPL BCG-MCNC: 3.5 G/DL (ref 3.5–5)
ALP SERPL-CCNC: 38 U/L (ref 34–104)
ALT SERPL W P-5'-P-CCNC: 8 U/L (ref 7–52)
ANION GAP SERPL CALCULATED.3IONS-SCNC: 7 MMOL/L (ref 4–13)
AST SERPL W P-5'-P-CCNC: 9 U/L (ref 13–39)
ATRIAL RATE: 99 BPM
BASOPHILS # BLD AUTO: 0.02 THOUSANDS/ÂΜL (ref 0–0.1)
BASOPHILS NFR BLD AUTO: 0 % (ref 0–1)
BILIRUB SERPL-MCNC: 0.91 MG/DL (ref 0.2–1)
BUN SERPL-MCNC: 10 MG/DL (ref 5–25)
CA-I BLD-SCNC: 1.06 MMOL/L (ref 1.12–1.32)
CALCIUM SERPL-MCNC: 8.1 MG/DL (ref 8.4–10.2)
CHLORIDE SERPL-SCNC: 106 MMOL/L (ref 96–108)
CO2 SERPL-SCNC: 26 MMOL/L (ref 21–32)
CREAT SERPL-MCNC: 0.97 MG/DL (ref 0.6–1.3)
EOSINOPHIL # BLD AUTO: 0.02 THOUSAND/ÂΜL (ref 0–0.61)
EOSINOPHIL NFR BLD AUTO: 0 % (ref 0–6)
ERYTHROCYTE [DISTWIDTH] IN BLOOD BY AUTOMATED COUNT: 14 % (ref 11.6–15.1)
GFR SERPL CREATININE-BSD FRML MDRD: 66 ML/MIN/1.73SQ M
GLUCOSE SERPL-MCNC: 128 MG/DL (ref 65–140)
HCT VFR BLD AUTO: 33.9 % (ref 34.8–46.1)
HGB BLD-MCNC: 10.7 G/DL (ref 11.5–15.4)
IMM GRANULOCYTES # BLD AUTO: 0.06 THOUSAND/UL (ref 0–0.2)
IMM GRANULOCYTES NFR BLD AUTO: 1 % (ref 0–2)
LYMPHOCYTES # BLD AUTO: 0.6 THOUSANDS/ÂΜL (ref 0.6–4.47)
LYMPHOCYTES NFR BLD AUTO: 5 % (ref 14–44)
MAGNESIUM SERPL-MCNC: 1.8 MG/DL (ref 1.9–2.7)
MCH RBC QN AUTO: 30.8 PG (ref 26.8–34.3)
MCHC RBC AUTO-ENTMCNC: 31.6 G/DL (ref 31.4–37.4)
MCV RBC AUTO: 98 FL (ref 82–98)
MONOCYTES # BLD AUTO: 0.53 THOUSAND/ÂΜL (ref 0.17–1.22)
MONOCYTES NFR BLD AUTO: 5 % (ref 4–12)
NEUTROPHILS # BLD AUTO: 9.99 THOUSANDS/ÂΜL (ref 1.85–7.62)
NEUTS SEG NFR BLD AUTO: 89 % (ref 43–75)
NRBC BLD AUTO-RTO: 0 /100 WBCS
P AXIS: 63 DEGREES
PHOSPHATE SERPL-MCNC: 2.2 MG/DL (ref 2.7–4.5)
PLATELET # BLD AUTO: 174 THOUSANDS/UL (ref 149–390)
PMV BLD AUTO: 11.1 FL (ref 8.9–12.7)
POTASSIUM SERPL-SCNC: 3.6 MMOL/L (ref 3.5–5.3)
PR INTERVAL: 138 MS
PROCALCITONIN SERPL-MCNC: 1.31 NG/ML
PROT SERPL-MCNC: 6.6 G/DL (ref 6.4–8.4)
QRS AXIS: 15 DEGREES
QRSD INTERVAL: 76 MS
QT INTERVAL: 334 MS
QTC INTERVAL: 428 MS
RBC # BLD AUTO: 3.47 MILLION/UL (ref 3.81–5.12)
RH BLD: POSITIVE
SODIUM SERPL-SCNC: 139 MMOL/L (ref 135–147)
T WAVE AXIS: 74 DEGREES
VENTRICULAR RATE: 99 BPM
WBC # BLD AUTO: 11.22 THOUSAND/UL (ref 4.31–10.16)

## 2025-07-11 PROCEDURE — 99232 SBSQ HOSP IP/OBS MODERATE 35: CPT | Performed by: SURGERY

## 2025-07-11 PROCEDURE — 84145 PROCALCITONIN (PCT): CPT | Performed by: NURSE PRACTITIONER

## 2025-07-11 PROCEDURE — 93005 ELECTROCARDIOGRAM TRACING: CPT

## 2025-07-11 PROCEDURE — 99232 SBSQ HOSP IP/OBS MODERATE 35: CPT | Performed by: STUDENT IN AN ORGANIZED HEALTH CARE EDUCATION/TRAINING PROGRAM

## 2025-07-11 PROCEDURE — 85025 COMPLETE CBC W/AUTO DIFF WBC: CPT | Performed by: NURSE PRACTITIONER

## 2025-07-11 PROCEDURE — 93010 ELECTROCARDIOGRAM REPORT: CPT | Performed by: INTERNAL MEDICINE

## 2025-07-11 PROCEDURE — 82330 ASSAY OF CALCIUM: CPT | Performed by: NURSE PRACTITIONER

## 2025-07-11 PROCEDURE — NC001 PR NO CHARGE: Performed by: PHYSICIAN ASSISTANT

## 2025-07-11 PROCEDURE — 80053 COMPREHEN METABOLIC PANEL: CPT | Performed by: NURSE PRACTITIONER

## 2025-07-11 PROCEDURE — 84100 ASSAY OF PHOSPHORUS: CPT | Performed by: NURSE PRACTITIONER

## 2025-07-11 PROCEDURE — 83735 ASSAY OF MAGNESIUM: CPT | Performed by: NURSE PRACTITIONER

## 2025-07-11 RX ORDER — POTASSIUM CHLORIDE 14.9 MG/ML
20 INJECTION INTRAVENOUS ONCE
Status: COMPLETED | OUTPATIENT
Start: 2025-07-11 | End: 2025-07-11

## 2025-07-11 RX ORDER — LABETALOL HYDROCHLORIDE 5 MG/ML
10 INJECTION, SOLUTION INTRAVENOUS EVERY 4 HOURS PRN
Status: DISCONTINUED | OUTPATIENT
Start: 2025-07-11 | End: 2025-07-15 | Stop reason: HOSPADM

## 2025-07-11 RX ORDER — ACETAMINOPHEN 10 MG/ML
1000 INJECTION, SOLUTION INTRAVENOUS ONCE
Status: COMPLETED | OUTPATIENT
Start: 2025-07-11 | End: 2025-07-11

## 2025-07-11 RX ORDER — CALCIUM GLUCONATE 20 MG/ML
2 INJECTION, SOLUTION INTRAVENOUS ONCE
Status: COMPLETED | OUTPATIENT
Start: 2025-07-11 | End: 2025-07-11

## 2025-07-11 RX ORDER — MAGNESIUM SULFATE HEPTAHYDRATE 40 MG/ML
2 INJECTION, SOLUTION INTRAVENOUS ONCE
Status: COMPLETED | OUTPATIENT
Start: 2025-07-11 | End: 2025-07-11

## 2025-07-11 RX ORDER — PANTOPRAZOLE SODIUM 40 MG/10ML
40 INJECTION, POWDER, LYOPHILIZED, FOR SOLUTION INTRAVENOUS EVERY 12 HOURS SCHEDULED
Status: DISCONTINUED | OUTPATIENT
Start: 2025-07-11 | End: 2025-07-15 | Stop reason: HOSPADM

## 2025-07-11 RX ORDER — HEPARIN SODIUM 5000 [USP'U]/ML
7500 INJECTION, SOLUTION INTRAVENOUS; SUBCUTANEOUS EVERY 8 HOURS SCHEDULED
Status: DISCONTINUED | OUTPATIENT
Start: 2025-07-11 | End: 2025-07-15 | Stop reason: HOSPADM

## 2025-07-11 RX ORDER — METOPROLOL TARTRATE 1 MG/ML
5 INJECTION, SOLUTION INTRAVENOUS EVERY 6 HOURS
Status: DISCONTINUED | OUTPATIENT
Start: 2025-07-11 | End: 2025-07-12

## 2025-07-11 RX ADMIN — CEFEPIME 2000 MG: 2 INJECTION, POWDER, FOR SOLUTION INTRAVENOUS at 14:55

## 2025-07-11 RX ADMIN — ACETAMINOPHEN 1000 MG: 10 INJECTION INTRAVENOUS at 08:30

## 2025-07-11 RX ADMIN — CHLORHEXIDINE GLUCONATE 15 ML: 1.2 SOLUTION ORAL at 21:21

## 2025-07-11 RX ADMIN — ACETAMINOPHEN 1000 MG: 10 INJECTION INTRAVENOUS at 18:38

## 2025-07-11 RX ADMIN — MORPHINE SULFATE 2 MG: 2 INJECTION, SOLUTION INTRAMUSCULAR; INTRAVENOUS at 14:51

## 2025-07-11 RX ADMIN — POTASSIUM CHLORIDE 20 MEQ: 14.9 INJECTION, SOLUTION INTRAVENOUS at 08:46

## 2025-07-11 RX ADMIN — HEPARIN SODIUM 7500 UNITS: 5000 INJECTION INTRAVENOUS; SUBCUTANEOUS at 21:21

## 2025-07-11 RX ADMIN — SODIUM CHLORIDE, SODIUM GLUCONATE, SODIUM ACETATE, POTASSIUM CHLORIDE, MAGNESIUM CHLORIDE, SODIUM PHOSPHATE, DIBASIC, AND POTASSIUM PHOSPHATE 125 ML/HR: .53; .5; .37; .037; .03; .012; .00082 INJECTION, SOLUTION INTRAVENOUS at 21:22

## 2025-07-11 RX ADMIN — METRONIDAZOLE 500 MG: 500 INJECTION, SOLUTION INTRAVENOUS at 17:51

## 2025-07-11 RX ADMIN — CEFEPIME 2000 MG: 2 INJECTION, POWDER, FOR SOLUTION INTRAVENOUS at 23:58

## 2025-07-11 RX ADMIN — MAGNESIUM SULFATE HEPTAHYDRATE 2 G: 40 INJECTION, SOLUTION INTRAVENOUS at 08:46

## 2025-07-11 RX ADMIN — PANTOPRAZOLE SODIUM 40 MG: 40 INJECTION, POWDER, FOR SOLUTION INTRAVENOUS at 23:56

## 2025-07-11 RX ADMIN — METOROPROLOL TARTRATE 5 MG: 5 INJECTION, SOLUTION INTRAVENOUS at 23:58

## 2025-07-11 RX ADMIN — METOROPROLOL TARTRATE 5 MG: 5 INJECTION, SOLUTION INTRAVENOUS at 17:51

## 2025-07-11 RX ADMIN — PANTOPRAZOLE SODIUM 40 MG: 40 INJECTION, POWDER, FOR SOLUTION INTRAVENOUS at 08:31

## 2025-07-11 RX ADMIN — SODIUM CHLORIDE, SODIUM GLUCONATE, SODIUM ACETATE, POTASSIUM CHLORIDE, MAGNESIUM CHLORIDE, SODIUM PHOSPHATE, DIBASIC, AND POTASSIUM PHOSPHATE 125 ML/HR: .53; .5; .37; .037; .03; .012; .00082 INJECTION, SOLUTION INTRAVENOUS at 13:25

## 2025-07-11 RX ADMIN — CHLORHEXIDINE GLUCONATE 15 ML: 1.2 SOLUTION ORAL at 08:31

## 2025-07-11 RX ADMIN — METOROPROLOL TARTRATE 5 MG: 5 INJECTION, SOLUTION INTRAVENOUS at 06:21

## 2025-07-11 RX ADMIN — METOROPROLOL TARTRATE 5 MG: 5 INJECTION, SOLUTION INTRAVENOUS at 13:26

## 2025-07-11 RX ADMIN — METRONIDAZOLE 500 MG: 500 INJECTION, SOLUTION INTRAVENOUS at 05:25

## 2025-07-11 RX ADMIN — SODIUM CHLORIDE, SODIUM GLUCONATE, SODIUM ACETATE, POTASSIUM CHLORIDE, MAGNESIUM CHLORIDE, SODIUM PHOSPHATE, DIBASIC, AND POTASSIUM PHOSPHATE 125 ML/HR: .53; .5; .37; .037; .03; .012; .00082 INJECTION, SOLUTION INTRAVENOUS at 05:26

## 2025-07-11 RX ADMIN — POTASSIUM PHOSPHATE, MONOBASIC AND POTASSIUM PHOSPHATE, DIBASIC 12 MMOL: 224; 236 INJECTION, SOLUTION, CONCENTRATE INTRAVENOUS at 09:53

## 2025-07-11 RX ADMIN — HEPARIN SODIUM 7500 UNITS: 5000 INJECTION INTRAVENOUS; SUBCUTANEOUS at 10:00

## 2025-07-11 RX ADMIN — CALCIUM GLUCONATE 2 G: 20 INJECTION, SOLUTION INTRAVENOUS at 06:25

## 2025-07-11 RX ADMIN — CEFEPIME 2000 MG: 2 INJECTION, POWDER, FOR SOLUTION INTRAVENOUS at 07:54

## 2025-07-11 RX ADMIN — METOROPROLOL TARTRATE 5 MG: 5 INJECTION, SOLUTION INTRAVENOUS at 00:44

## 2025-07-11 NOTE — ASSESSMENT & PLAN NOTE
NPO  IV resuscitation, follow endpoints  ABX  Hold Brillinta   ICU level of care  Discussed with patient , if decompensation will require operative intervention including possible ostomy  High risk of complications due to antiplatelet medications, Yazidism status, BMI 50

## 2025-07-11 NOTE — PLAN OF CARE
Problem: PAIN - ADULT  Goal: Verbalizes/displays adequate comfort level or baseline comfort level  Description: Interventions:  - Encourage patient to monitor pain and request assistance  - Assess pain using appropriate pain scale  - Administer analgesics as ordered based on type and severity of pain and evaluate response  - Implement non-pharmacological measures as appropriate and evaluate response  - Consider cultural and social influences on pain and pain management  - Notify physician/advanced practitioner if interventions unsuccessful or patient reports new pain  - Educate patient/family on pain management process including their role and importance of  reporting pain   - Provide non-pharmacologic/complimentary pain relief interventions  Outcome: Progressing     Problem: INFECTION - ADULT  Goal: Absence or prevention of progression during hospitalization  Description: INTERVENTIONS:  - Assess and monitor for signs and symptoms of infection  - Monitor lab/diagnostic results  - Monitor all insertion sites, i.e. indwelling lines, tubes, and drains  - Monitor endotracheal if appropriate and nasal secretions for changes in amount and color  - [place] appropriate cooling/warming therapies per order  - Administer medications as ordered  - Instruct and encourage patient and family to use good hand hygiene technique  - Identify and instruct in appropriate isolation precautions for identified infection/condition  Outcome: Progressing  Goal: Absence of fever/infection during neutropenic period  Description: INTERVENTIONS:  - Monitor WBC  - Perform strict hand hygiene  - Limit to healthy visitors only  - No plants, dried, fresh or silk flowers with leblanc in patient room  Outcome: Progressing     Problem: SAFETY ADULT  Goal: Patient will remain free of falls  Description: INTERVENTIONS:  - Educate patient/family on patient safety including physical limitations  - Instruct patient to call for assistance with activity   -  Consider consulting OT/PT to assist with strengthening/mobility based on AM PAC & JH-HLM score  - Consult OT/PT to assist with strengthening/mobility   - Keep Call bell within reach  - Keep bed low and locked with side rails adjusted as appropriate  - Keep care items and personal belongings within reach  - Initiate and maintain comfort rounds  - Make Fall Risk Sign visible to staff  - Offer Toileting every 2 Hours, in advance of need  - Initiate/Maintain bed alarm    - Apply yellow socks and bracelet for high fall risk patients  - Consider moving patient to room near nurses station  Outcome: Progressing  Goal: Maintain or return to baseline ADL function  Description: INTERVENTIONS:  -  Assess patient's ability to carry out ADLs; assess patient's baseline for ADL function and identify physical deficits which impact ability to perform ADLs (bathing, care of mouth/teeth, toileting, grooming, dressing, etc.)  - Assess/evaluate cause of self-care deficits   - Assess range of motion  - Assess patient's mobility; develop plan if impaired  - Assess patient's need for assistive devices and provide as appropriate  - Encourage maximum independence but intervene and supervise when necessary  - Involve family in performance of ADLs  - Assess for home care needs following discharge   - Consider OT consult to assist with ADL evaluation and planning for discharge  - Provide patient education as appropriate  - Monitor functional capacity and physical performance, use of AM PAC & JH-HLM   - Monitor gait, balance and fatigue with ambulation    Outcome: Progressing  Goal: Maintains/Returns to pre admission functional level  Description: INTERVENTIONS:  - Perform AM-PAC 6 Click Basic Mobility/ Daily Activity assessment daily.  - Set and communicate daily mobility goal to care team and patient/family/caregiver.   - Collaborate with rehabilitation services on mobility goals if consulted  - Perform Range of Motion 3 times a day.  -  Reposition patient every 2 hours.  - Dangle patient 3 times a day  - Stand patient 3 times a day  - Ambulate patient 3 times a day  - Out of bed to chair 3 times a day   - Out of bed for meals 3 times a day  - Out of bed for toileting  - Record patient progress and toleration of activity level   Outcome: Progressing

## 2025-07-11 NOTE — ASSESSMENT & PLAN NOTE
Patient with history of right-sided brain aneurysms coiled and clipped.  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  Patient admits to taking her Brilinta once a day as she forgets to take the second dose often.  Endorses short-term memory loss as a sequelae of her brain aneurysm with no motor or sensory deficits.    Plan:  Holding aspirin and Brilinta pending decision on surgery

## 2025-07-11 NOTE — PLAN OF CARE
Problem: PAIN - ADULT  Goal: Verbalizes/displays adequate comfort level or baseline comfort level  Description: Interventions:  - Encourage patient to monitor pain and request assistance  - Assess pain using appropriate pain scale  - Administer analgesics as ordered based on type and severity of pain and evaluate response  - Implement non-pharmacological measures as appropriate and evaluate response  - Consider cultural and social influences on pain and pain management  - Notify physician/advanced practitioner if interventions unsuccessful or patient reports new pain  - Educate patient/family on pain management process including their role and importance of  reporting pain   - Provide non-pharmacologic/complimentary pain relief interventions  7/11/2025 0128 by Anne Silver, RN  Outcome: Progressing  7/11/2025 0126 by Anne Silver, RN  Outcome: Progressing

## 2025-07-11 NOTE — ASSESSMENT & PLAN NOTE
Surgery consulted in the emergency room, favors observation on antibiotics given patient's dual antiplatelet therapy and status as a Synagogue declining blood products.  Levaquin, Flagyl, vancomycin ordered in the emergency room.    Plan:  Surgery following  Serial abdominal exams  Finley catheter for decompression  Zofran for nausea vomiting, IV Tylenol for mild pain, morphine for severe pain.  Continue Flagyl, vancomycin and add cefepime.  Currently holding Brilinta and aspirin, heparin for DVT prophylaxis until decision on surgery made

## 2025-07-11 NOTE — UTILIZATION REVIEW
Initial Clinical Review    Admission: Date/Time/Statement:   Admission Orders (From admission, onward)       Ordered        07/10/25 2012  Inpatient Admission  Once            07/10/25 1833  INPATIENT ADMISSION  Once,   Status:  Canceled                          Orders Placed This Encounter   Procedures    Inpatient Admission     Standing Status:   Standing     Number of Occurrences:   1     Level of Care:   Critical Care [15]     Estimated length of stay:   More than 2 Midnights     Certification:   I certify that inpatient services are medically necessary for this patient for a duration of greater than two midnights. See H&P and MD Progress Notes for additional information about the patient's course of treatment.     ED Arrival Information       Expected   7/10/2025     Arrival   7/10/2025 14:58    Acuity   Urgent              Means of arrival   Ambulance    Escorted by   [facility]   Hospitalist    Admission type   Emergency              Arrival complaint   Abdominal pain             Chief Complaint   Patient presents with    Abdominal Pain     Lower abdominal pain. Nausea, vomiting and diarrhea since yesterday, febrile today, 102.2 at urgent care prior to arrival. Denies chest pain or shortness of breath        Initial Presentation: 55 y.o. female to ED from home via EMS w/ PMHX MO, HTN , brain aneurysm post clipping /coiling w/ subarachnoid hemorrhage and assoc afib . C/o lower abd pain since yest w/ N/V/D .CT imaging reveals likely perforated diverticulitis with small foci of pneumoperitoneum. 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 Admitted IP status for perforated sigmoid colon d/t diverticulitis . Plan for surgery consult , serial abd examd , mora catheter , zofran , morphine , levaquin , flagyl , vanco and add cefepime . Hold brilinta and asa, heparin DVT ppx . HTN hold home meds while NPO , IV lopressor prn . Afib cardizem discontinued , monitor . Sepsis IVF , abx , BC , ua  , lactic and pct .     7/10 Surgery Consult   Perforation sigmoid colon Npo , IVF , abx , hold brilinta , if decompensation will require operative intervention including possible ostomy     Date:  7/11  Day 2: no acute evnts overnight . Remains NPO . Serial exams . IVF and NPO . Add IV PPI . IV morphine for pain control . Cont IV lopressor . Monitor and replete lytes prn .     ED Treatment-Medication Administration from 07/10/2025 1429 to 07/10/2025 2140         Date/Time Order Dose Route Action     07/10/2025 1530 sodium chloride 0.9 % bolus 1,000 mL 1,000 mL Intravenous New Bag     07/10/2025 1537 ondansetron (ZOFRAN) injection 4 mg 4 mg Intravenous Given     07/10/2025 1535 morphine injection 4 mg 4 mg Intravenous Given     07/10/2025 1715 diltiazem (CARDIZEM) injection 20 mg 20 mg Intravenous Given     07/10/2025 1715 diltiazem (CARDIZEM) 125 mg in sodium chloride 0.9 % 125 mL infusion 5 mg/hr Intravenous New Bag     07/10/2025 1747 diltiazem (CARDIZEM) 125 mg in sodium chloride 0.9 % 125 mL infusion 7.5 mg/hr Intravenous Rate/Dose Change     07/10/2025 1756 diltiazem (CARDIZEM) 125 mg in sodium chloride 0.9 % 125 mL infusion 10 mg/hr Intravenous Rate/Dose Change     07/10/2025 1820 diltiazem (CARDIZEM) 125 mg in sodium chloride 0.9 % 125 mL infusion 15 mg/hr Intravenous Rate/Dose Change     07/10/2025 1957 diltiazem (CARDIZEM) 125 mg in sodium chloride 0.9 % 125 mL infusion 10 mg/hr Intravenous Rate/Dose Change     07/10/2025 1704 iohexol (OMNIPAQUE) 350 MG/ML injection (MULTI-DOSE) 100 mL 100 mL Intravenous Given     07/10/2025 2013 vancomycin (VANCOCIN) 2,000 mg in sodium chloride 0.9 % 500 mL IVPB 2,000 mg Intravenous New Bag     07/10/2025 1807 metroNIDAZOLE (FLAGYL) IVPB (premix) 500 mg 100 mL 500 mg Intravenous New Bag     07/10/2025 1841 levofloxacin (LEVAQUIN) IVPB (premix in dextrose) 750 mg 150 mL 750 mg Intravenous New Bag     07/10/2025 1917 sodium chloride 0.9 % infusion 150 mL/hr Intravenous New  Bag     07/10/2025 1909 morphine injection 4 mg 4 mg Intravenous Given     07/10/2025 2115 lactated ringers bolus 1,000 mL 1,000 mL Intravenous New Bag     07/10/2025 2118 lactated ringers bolus 1,000 mL 1,000 mL Intravenous New Bag     07/10/2025 2119 multi-electrolyte (Plasmalyte-A/Isolyte-S PH 7.4/Normosol-R) IV solution 125 mL/hr Intravenous New Bag            Scheduled Medications:  cefepime, 2,000 mg, Intravenous, Q8H  chlorhexidine, 15 mL, Mouth/Throat, Q12H CELE  heparin (porcine), 7,500 Units, Subcutaneous, Q8H CELE  metoprolol, 5 mg, Intravenous, Q6H  metroNIDAZOLE, 500 mg, Intravenous, Q12H  pantoprazole, 40 mg, Intravenous, Q12H CELE  potassium phosphate, 12 mmol, Intravenous, Once  [Held by provider] ticagrelor, 90 mg, Oral, BID      Continuous IV Infusions:  multi-electrolyte, 125 mL/hr, Intravenous, Continuous      PRN Meds:  acetaminophen, 1,000 mg, Intravenous, Q6H PRN  labetalol, 10 mg, Intravenous, Q4H PRN  morphine injection, 2 mg, Intravenous, Q3H PRN  ondansetron, 4 mg, Intravenous, Q6H PRN      ED Triage Vitals [07/10/25 1503]   Temperature Pulse Respirations Blood Pressure SpO2 Pain Score   99.3 °F (37.4 °C) 101 20 (!) 210/91 97 % 9     Weight (last 2 days)       Date/Time Weight    07/11/25 0544 139 (306)    07/10/25 2149 137 (302.91)    07/10/25 2013 137 (302)    07/10/25 1503 140 (307.54)            Vital Signs (last 3 days)       Date/Time Temp Pulse Resp BP MAP (mmHg) SpO2 O2 Device Patient Position - Orthostatic VS [place] Coma Scale Score Pain    07/11/25 1100 99 °F (37.2 °C) 77 26 150/93 114 93 % None (Room air) Lying -- 6    07/11/25 0700 99.7 °F (37.6 °C) 86 27 143/71 97 93 % -- Lying -- --    07/11/25 0631 -- 85 33 139/61 88 95 % -- -- -- --    07/11/25 0630 -- 85 29 156/64 92 94 % -- -- -- --    07/11/25 0600 -- 92 30 174/76 109 95 % -- -- -- --    07/11/25 0500 -- 92 29 157/70 100 95 % -- -- -- --    07/11/25 0400 -- 90 30 133/93 105 95 % -- -- 15 --    07/11/25 0300 -- 82 25 122/57  82 94 % -- -- -- --    07/11/25 0200 -- 81 20 114/58 80 96 % -- -- -- --    07/11/25 0100 -- 79 25 127/60 87 95 % -- -- -- --    07/11/25 0000 -- 85 25 133/71 91 91 % -- -- 15 --    07/10/25 2300 99.1 °F (37.3 °C) 94 33 145/97 114 94 % -- -- -- --    07/10/25 2200 -- 94 -- 161/65 93 95 % -- -- 15 6    07/10/25 2149 100.2 °F (37.9 °C) 92 22 180/68 -- -- -- -- -- --    07/10/25 2012 100.4 °F (38 °C) -- -- -- -- -- -- -- -- --    07/10/25 1909 -- -- -- -- -- -- -- -- -- 9    07/10/25 1815 -- 142 24 229/89 128 97 % -- -- -- --    07/10/25 1800 -- 146 27 187/89 126 100 % -- -- -- --    07/10/25 1745 -- 125 30 181/91 123 100 % -- -- -- --    07/10/25 1730 -- 136 26 -- -- 87 % -- -- -- --    07/10/25 1715 -- 121 33 165/62 103 -- -- -- -- --    07/10/25 1642 -- 143 32 171/80 115 -- -- -- -- --    07/10/25 1620 -- 144 32 -- -- 95 % -- -- -- --    07/10/25 1535 -- -- -- -- -- -- -- -- -- 9    07/10/25 1503 99.3 °F (37.4 °C) 101 20 210/91 -- 97 % -- Lying -- 9              Pertinent Labs/Diagnostic Test Results:   Radiology:  CT abdomen pelvis with contrast   Final Interpretation by Tal Zavala MD (07/10 1735)      Perforated sigmoid diverticulitis.   Adjacent extraluminal free air and scattered small volume pneumoperitoneum.   2 cm adjacent loculated fluid collection anteriorly.   Reactive enterocolitis.      Prominent bilateral inguinal nodes, likely reactive.      Findings were discussed with Dr. Celestin from the emergency department at 5:30 p.m. on 7/10/2025         Workstation performed: [id number]           Cardiology:  ECG 12 lead    by Interface, Ris Results In (07/11 0849)      ECG 12 lead    by Interface, Ris Results In (07/10 1633)      ECG 12 lead    by Interface, Ris Results In (07/10 1509)        GI:  No orders to display           Results from last 7 days   Lab Units 07/11/25  0541 07/10/25  1532   WBC Thousand/uL 11.22* 12.47*   HEMOGLOBIN g/dL 10.7* 13.3   HEMATOCRIT % 33.9* 40.1   PLATELETS  "Thousands/uL 174 236   TOTAL NEUT ABS Thousands/µL 9.99* 11.33*         Results from last 7 days   Lab Units 07/11/25  0521 07/10/25  1532   SODIUM mmol/L 139 137   POTASSIUM mmol/L 3.6 3.7   CHLORIDE mmol/L 106 102   CO2 mmol/L 26 26   ANION GAP mmol/L 7 9   BUN mg/dL 10 15   CREATININE mg/dL 0.97 1.08   EGFR ml/min/1.73sq m 66 58   CALCIUM mg/dL 8.1* 9.7   CALCIUM, IONIZED mmol/L 1.06*  --    MAGNESIUM mg/dL 1.8*  --    PHOSPHORUS mg/dL 2.2*  --      Results from last 7 days   Lab Units 07/11/25  0521 07/10/25  1532   AST U/L 9* 12*   ALT U/L 8 12   ALK PHOS U/L 38 46   TOTAL PROTEIN g/dL 6.6 8.3   ALBUMIN g/dL 3.5 4.5   TOTAL BILIRUBIN mg/dL 0.91 1.14*         Results from last 7 days   Lab Units 07/11/25  0521 07/10/25  1532   GLUCOSE RANDOM mg/dL 128 135             No results found for: \"BETA-HYDROXYBUTYRATE\"                           Results from last 7 days   Lab Units 07/10/25  2028   PROTIME seconds 16.0*   INR  1.21*   PTT seconds 35*         Results from last 7 days   Lab Units 07/11/25  0521 07/10/25  2028   PROCALCITONIN ng/ml 1.31* 1.06*     Results from last 7 days   Lab Units 07/10/25  2028   LACTIC ACID mmol/L 0.9                                 Results from last 7 days   Lab Units 07/10/25  1532   LIPASE u/L 8*                 Results from last 7 days   Lab Units 07/10/25  1921   CLARITY UA  Clear   COLOR UA  Colorless   SPEC GRAV UA  1.033*   PH UA  6.0   GLUCOSE UA mg/dl Negative   KETONES UA mg/dl Negative   BLOOD UA  Negative   PROTEIN UA mg/dl Negative   NITRITE UA  Negative   BILIRUBIN UA  Negative   UROBILINOGEN UA (BE) mg/dl <2.0   LEUKOCYTES UA  Negative                                 Results from last 7 days   Lab Units 07/10/25  2113   BLOOD CULTURE  Received in Microbiology Lab. Culture in Progress.  Received in Microbiology Lab. Culture in Progress.                   Past Medical History[1]  Present on Admission:   Essential hypertension   MORBID (SEVERE) OBESITY DUE TO EXCESS " CALORIES   Perforation of sigmoid colon due to diverticulitis   Sepsis without acute organ dysfunction (HCC)   Paroxysmal atrial fibrillation (HCC)   History of brain aneurysm-secured   Refusal of blood product      Admitting Diagnosis: Atrial flutter (HCC) [I48.92]  Diverticulitis [K57.92]  Bowel perforation (HCC) [K63.1]  Abdominal pain [R10.9]  Age/Sex: 55 y.o. female    Network Utilization Review Department  ATTENTION: Please call with any questions or concerns to [phone] and carefully listen to the prompts so that you are directed to the right person. All voicemails are confidential.   For Discharge needs, contact Care Management DC Support Team at [phone] opt. 2  Send all requests for admission clinical reviews, approved or denied determinations and any other requests to dedicated fax number below belonging to the campus where the patient is receiving treatment. List of dedicated fax numbers for the Facilities:  FACILITY NAME UR FAX NUMBER   ADMISSION DENIALS (Administrative/Medical Necessity) [phone]   DISCHARGE SUPPORT TEAM (NETWORK) [phone]   PARENT CHILD HEALTH (Maternity/NICU/Pediatrics) [phone]   [facility] [phone]   [facility] [phone]   [facility] [phone]   [facility] [phone]   [facility] [phone]   [facility] [phone]   [facility] [phone]   [facility] [phone]   [facility] [phone]   [facility] [phone]   [facility] [phone]   [facility] [phone]              [1]   Past Medical History:  Diagnosis Date    Brain aneurysm     Hypertension

## 2025-07-11 NOTE — PROGRESS NOTES
Progress Note - Critical Care/ICU   Name: Cammy Prieto 55 y.o. female I MRN: [id number]  Unit/Bed#: ICU 11 I Date of Admission: 7/10/2025   Date of Service: 7/11/2025 I Hospital Day: 1       Critical Care Interval Transfer Note:    Brief Hospital Summary:    55-year-old patient, was admitted to the ICU on July 10, 2025, due to a perforation of the sigmoid colon secondary to diverticulitis. Her medical history includes severe obesity, essential hypertension, paroxysmal atrial fibrillation, and a secured brain aneurysm. She is a Jain and refuses blood products and albumin. She presented with severe abdominal pain, fever, and dysuria. Initially had Rapid AFib requiring cardizem gtt but this was turned off overnight. Plan is non-op surgery will follow. She requires telemetry for 24 hours due to new onset AFib. She should follow up with her cardiology regarding AC as this is her second episode of it (albeit in the setting of critical illness both times) and possible loop recorder.     Barriers to discharge:   IV ABX  NPO status     Consults: IP CONSULT TO CASE MANAGEMENT  IP CONSULT TO NUTRITION SERVICES  IP CONSULT TO ACUTE CARE SURGERY    Recommended to review admission imaging for incidental findings and document in discharge navigator: Chart reviewed, no known incidental findings noted at this time.      Discharge Plan: Anticipate discharge in 48 hrs to discharge location to be determined pending rehab evaluations.  [place] Plan: Continue for accurate I/O monitoring for 48 hours       Patient seen and evaluated by Critical Care today and deemed to be appropriate for transfer to Med Surg with Telemetry. Spoke to Dr. Logan from [facility] to accept transfer. Critical care can be contacted via SecureChat with any questions or concerns. Please use the Critical Care AP Role in Secure Chat for any provider inquires until the patient is transferred out of the ICU or until tomorrow at 0600.

## 2025-07-11 NOTE — PROGRESS NOTES
Progress Note - Surgery-General   Name: Cammy Prieto 55 y.o. female I MRN: [id number]  Unit/Bed#: ICU 11 I Date of Admission: 7/10/2025   Date of Service: 7/11/2025 I Hospital Day: 1    Assessment & Plan  Perforation of sigmoid colon due to diverticulitis    Pt states she feels much better this am, she does not have the pain she had when she reported to the ED. Pt admitted to ICU after development of rapid a fib requiring Cardizem and close monitoring   No bowel function. WBC 11.22 from 12.4  on admission  Procalcitonin 1.31, 1.06   Phosphorus 2.2  Magnesium 1.8   Pt is Holiness and refuses blood products and albumin.   H/o multiple intracerebral aneurysms, on Brillinta.   VSS with the exception of respiratory rate 20-20. Pt with GONZALO and did not have CPAP on last night, she is not tachypneic, she is sitting comfortably in the chair, O2 sat 95%     NPO, ice chips ok   Ok to transfer to [facility] for management of a fib, surgery on consult   IV resuscitation, follow endpoints  Replete electrolytes as needed   ABX :cefepime and flagyl   Hold Brillinta   ICU level of care for cardiac management of new onset of atrial flutter,   Discussed with patient that she has improved with IVF, antibiotics, and bowel rest within the first 24 hours, she will continue conservative management.   Discussed with patient , if decompensation will require operative intervention including possible ostomy  High risk of complications due to antiplatelet medications, Evangelical status, BMI 50  Pt will have her nephew bring in her CPAP from home    Essential hypertension  Per SLIM   Paroxysmal atrial fibrillation (HCC)  Per SLIM   MORBID (SEVERE) OBESITY DUE TO EXCESS CALORIES  Per SLIM   Sepsis without acute organ dysfunction (HCC)  Per SLIM   History of brain aneurysm-secured  Per SLIM   Refusal of blood product  Per SLIM         Subjective   I feel much better than last night. The waves of pain have stopped. It is now just a  pressure when you examine me.   I have not passed gas or had bowel movement yet    Objective :  Temp:  [99 °F (37.2 °C)-100.4 °F (38 °C)] 99 °F (37.2 °C)  HR:  [] 82  BP: (114-229)/(57-97) 137/78  Resp:  [20-33] 28  SpO2:  [87 %-100 %] 95 %  O2 Device: None (Room air)    I/O         07/09 0701  07/10 0700 07/10 0701  07/11 0700 07/11 0701  07/12 0700    P.O.  0     I.V. (mL/kg)  1085.4 (7.8)     Total Intake(mL/kg)  1085.4 (7.8)     Urine (mL/kg/hr)  705 675 (0.7)    Total Output  705 675    Net  +380.4 -675                 Lines/Drains/Airways       Active Status       Name Placement date Placement time Site Days    Urethral Catheter Latex 16 Fr. 07/10/25  2057  Latex  less than 1                  Physical Exam  Constitutional:       Appearance: She is obese.     Cardiovascular:      Rate and Rhythm: Normal rate and regular rhythm.   Pulmonary:      Effort: Pulmonary effort is normal.      Breath sounds: Normal breath sounds.   Abdominal:      Tenderness: There is no guarding or rebound.      Comments: Soft, obese, mild tenderness in LLQ, no masses, hernia  NBS       Musculoskeletal:      Comments: No calf tenderness      Skin:     General: Skin is warm and dry.     Neurological:      General: No focal deficit present.      Mental Status: She is alert and oriented to person, place, and time.     Psychiatric:         Mood and Affect: Mood normal.         Behavior: Behavior normal.           Lab Results: I have reviewed the following results:  Recent Labs     07/10/25  2028 07/11/25  0521 07/11/25  0541   WBC  --   --  11.22*   HGB  --   --  10.7*   HCT  --   --  33.9*   PLT  --   --  174   SODIUM  --  139  --    K  --  3.6  --    CL  --  106  --    CO2  --  26  --    BUN  --  10  --    CREATININE  --  0.97  --    GLUC  --  128  --    CAIONIZED  --  1.06*  --    MG  --  1.8*  --    PHOS  --  2.2*  --    AST  --  9*  --    ALT  --  8  --    ALB  --  3.5  --    TBILI  --  0.91  --    ALKPHOS  --  38  --    PTT  35*  --   --    INR 1.21*  --   --    LACTICACID 0.9  --   --        Ese HUNTLEY

## 2025-07-11 NOTE — ASSESSMENT & PLAN NOTE
Pt states she feels much better this am, she does not have the pain she had when she reported to the ED. Pt admitted to ICU after development of rapid a fib requiring Cardizem and close monitoring   No bowel function. WBC 11.22 from 12.4  on admission  Procalcitonin 1.31, 1.06   Phosphorus 2.2  Magnesium 1.8   Pt is Bahai and refuses blood products and albumin.   H/o multiple intracerebral aneurysms, on Brillinta.   VSS with the exception of respiratory rate 20-20. Pt with GONZALO and did not have CPAP on last night, she is not tachypneic, she is sitting comfortably in the chair, O2 sat 95%     NPO, ice chips ok   Ok to transfer to [facility] for management of a fib, surgery on consult   IV resuscitation, follow endpoints  Replete electrolytes as needed   ABX :cefepime and flagyl   Hold Brillinta   ICU level of care for cardiac management of new onset of atrial flutter,   Discussed with patient that she has improved with IVF, antibiotics, and bowel rest within the first 24 hours, she will continue conservative management.   Discussed with patient , if decompensation will require operative intervention including possible ostomy  High risk of complications due to antiplatelet medications, Spiritism status, BMI 50  Pt will have her nephew bring in her CPAP from home

## 2025-07-11 NOTE — SEPSIS NOTE
Sepsis Note   Cammy Prieto 55 y.o. female MRN: [id number]  Unit/Bed#: ED 06 Encounter: [id number]       Initial Sepsis Screening       Row Name 07/10/25 2002                Is the patient's history suggestive of a new or worsening infection? Yes (Proceed)  -ET        Suspected source of infection acute abdominal infection  -ET        Indicate SIRS criteria Hyperthemia > 38.3C (100.9F) OR Hypothermia <36C (96.8F);Tachycardia > 90 bpm;Tachypnea > 20 resp per min;Leukocytosis (WBC > 26811 IJL) OR Leukopenia (WBC <4000 IJL) OR Bandemia (WBC >10% bands)  -ET        Are two or more of the above signs & symptoms of infection both present and new to the patient? Yes (Proceed)  -ET        Assess for evidence of organ dysfunction: Are any of the below criteria present within 6 hours of suspected infection and SIRS criteria that are NOT considered to be chronic conditions? --                  User Key  (r) = Recorded By, (t) = Taken By, (c) = Cosigned By      Initials Name Provider Type    NICHOLE Bowen Nurse Practitioner                   Initial Sepsis Screening       Row Name 07/10/25 2002                   Initial Sepsis Assessment    Is the patient's history suggestive of a new or worsening infection? Yes (Proceed)  -ET        Suspected source of infection acute abdominal infection  -ET        Indicate SIRS criteria Hyperthemia > 38.3C (100.9F) OR Hypothermia <36C (96.8F);Tachycardia > 90 bpm;Tachypnea > 20 resp per min;Leukocytosis (WBC > 98970 IJL) OR Leukopenia (WBC <4000 IJL) OR Bandemia (WBC >10% bands)  -ET        Are two or more of the above signs & symptoms of infection both present and new to the patient? Yes (Proceed)  -ET                  User Key  (r) = Recorded By, (t) = Taken By, (c) = Cosigned By      Initials Name Provider Type    NICHOLE Bowen Nurse Practitioner                   Pt without signs of end organ damage.      Body mass index is 51.18  kg/m².  Wt Readings from Last 1 Encounters:   07/10/25 (!) 140 kg (307 lb 8.7 oz)     IBW (Ideal Body Weight): 57 kg    Ideal body weight: 57 kg (125 lb 10.6 oz)  Adjusted ideal body weight: 90 kg (198 lb 6.6 oz)

## 2025-07-11 NOTE — PROGRESS NOTES
Progress Note - Critical Care/ICU   Name: Cammy Prieto 55 y.o. female I MRN: [id number]  Unit/Bed#: ICU 11 I Date of Admission: 7/10/2025   Date of Service: 7/11/2025 I Hospital Day: 1      Assessment & Plan  Perforation of sigmoid colon due to diverticulitis  Surgery consulted in the emergency room, favors observation on antibiotics given patient's dual antiplatelet therapy and status as a Spiritism declining blood products.  Levaquin, Flagyl, vancomycin ordered in the emergency room.    Plan:  Surgery following  Serial abdominal exams  Finley catheter for decompression  Zofran for nausea vomiting, IV Tylenol for mild pain, morphine for severe pain.  Continue Flagyl, vancomycin and add cefepime.  Currently holding Brilinta and aspirin, heparin for DVT prophylaxis until decision on surgery made      Essential hypertension  Holding home meds for now while n.p.o.  Start Lopressor IV while n.p.o.  Paroxysmal atrial fibrillation (HCC)  Patient with history of paroxysmal atrial fibrillation in the setting of her subarachnoid hemorrhage, does not appear to be chronic.  Likely related to SIRS response due to perforated diverticulitis  Was started on Cardizem in the emergency room and converted back to sinus rhythm.  Cardizem discontinued  Plan:  Remains in sinus rhythm  Continue to monitor  MORBID (SEVERE) OBESITY DUE TO EXCESS CALORIES  Encourage lifestyle modifications  Nutrition consult    Sepsis without acute organ dysfunction (HCC)  Met sepsis criteria as evidenced by fever, tachycardia, tachypnea, and white count with left shift  No evidence for endorgan dysfunction  IV fluids 30 mL/kg given  Antibiotics started in the emergency room-continue cefepime, vancomycin, and Flagyl  Blood cultures, UA sent  Lactic acid 0.9  Procalcitonin 1.06, repeat this morning  History of brain aneurysm-secured  Patient with history of right-sided brain aneurysms coiled and clipped.  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  Patient admits to taking her Brilinta once a day as she forgets to take the second dose often.  Endorses short-term memory loss as a sequelae of her brain aneurysm with no motor or sensory deficits.    Plan:  Holding aspirin and Brilinta pending decision on surgery  Disposition: Stepdown Level 1    ICU Core Measures     A: Assess, Prevent, and Manage Pain Has pain been assessed? Yes  Need for changes to pain regimen? No   B: Both SAT/SAT  N/A   C: Choice of Sedation RASS Goal: N/A patient not on sedation  Need for changes to sedation or analgesia regimen? N/A   D: Delirium CAM-ICU: Negative   E: Early Mobility  Plan for early mobility? Yes   F: Family Engagement Plan for family engagement today? Yes       Antibiotic Review: Awaiting culture results.  and Continue broad spectrum secondary to severity of illness.     Review of Invasive Devices:    Tushar Plan: Continue for accurate I/O monitoring for 48 hours        Prophylaxis:  VTE Contraindicated secondary to: Possible OR   Stress Ulcer  not ordered         24 Hour Events : Patient states pain well-managed on current regimen.  No acute events reported by nursing overnight.    Subjective       Objective :                   Vitals I/O      Most Recent Min/Max in 24hrs   Temp 99.1 °F (37.3 °C) Temp  Min: 99.1 °F (37.3 °C)  Max: 102.2 °F (39 °C)   Pulse 92 Pulse  Min: 79  Max: 146   Resp (!) 29 Resp  Min: 19  Max: 33   /70 BP  Min: 114/58  Max: 229/89   O2 Sat 95 % SpO2  Min: 87 %  Max: 100 %      Intake/Output Summary (Last 24 hours) at 7/11/2025 0544  Last data filed at 7/11/2025 0400  Gross per 24 hour   Intake 835.42 ml   Output 555 ml   Net 280.42 ml       Diet NPO    Invasive Monitoring           Physical Exam   Physical Exam  Vitals and nursing note reviewed.   Eyes:      General: No scleral icterus.     Extraocular Movements: Extraocular movements intact.      Conjunctiva/sclera: Conjunctivae normal.      Pupils: Pupils  are equal, round, and reactive to light.   Skin:     General: Skin is warm and dry.      Capillary Refill: Capillary refill takes less than 2 seconds.   HENT:      Head: Normocephalic and atraumatic.      Mouth/Throat:      Mouth: Mucous membranes are moist.   Neck:      Vascular: No JVD.   Cardiovascular:      Rate and Rhythm: Normal rate and regular rhythm.      Pulses: Normal pulses.      Heart sounds: Normal heart sounds.   Musculoskeletal:         General: Normal range of motion.      Right lower leg: No edema.      Left lower leg: No edema.   Abdominal: General: Bowel sounds are decreased. There is distension.     Palpations: Abdomen is soft.      Tenderness: There is generalized abdominal tenderness. There is guarding. There is no rebound.      Comments: Generalized abdominal tenderness, greater over left lower quadrant   Constitutional:       General: She is not in acute distress.     Appearance: She is ill-appearing.   Pulmonary:      Effort: Pulmonary effort is normal. No accessory muscle usage, respiratory distress or accessory muscle usage.      Breath sounds: Normal breath sounds.   Secretions are normal.Chest:      Chest wall: No tenderness.   Psychiatric:         Speech: Speech is not no receptive aphasia or no expressive aphasia.   Neurological:      General: No focal deficit present.      Mental Status: She is oriented to person, place and time. Mental status is at baseline. She is calm.      Cranial Nerves: No dysarthria or facial asymmetry.      Motor: gross motor function is at baseline for patient. Strength full and intact in all extremities. No motor deficit.          Diagnostic Studies        Lab Results: I have reviewed the following results:     Medications:  Scheduled PRN   cefepime, 2,000 mg, Q8H  chlorhexidine, 15 mL, Q12H CELE  metoprolol, 5 mg, Q6H  metroNIDAZOLE, 500 mg, Q12H  [Held by provider] ticagrelor, 90 mg, BID      acetaminophen, 1,000 mg, Q6H PRN  morphine injection, 2 mg, Q3H  PRN  ondansetron, 4 mg, Q6H PRN       Continuous    multi-electrolyte, 125 mL/hr, Last Rate: 125 mL/hr (07/11/25 0526)         Labs:   CBC    Recent Labs     07/10/25  1532 07/11/25  0541   WBC 12.47* 11.22*   HGB 13.3 10.7*   HCT 40.1 33.9*    174     BMP    Recent Labs     07/10/25  1532   SODIUM 137   K 3.7      CO2 26   AGAP 9   BUN 15   CREATININE 1.08   CALCIUM 9.7       Coags    Recent Labs     07/10/25  2028   INR 1.21*   PTT 35*        Additional Electrolytes  Recent Labs     07/11/25  0521   CAIONIZED 1.06*          Blood Gas    No recent results  No recent results LFTs  Recent Labs     07/10/25  1532   ALT 12   AST 12*   ALKPHOS 46   ALB 4.5   TBILI 1.14*       Infectious  Recent Labs     07/10/25  2028   PROCALCITONI 1.06*     Glucose  Recent Labs     07/10/25  1532   GLUC 135

## 2025-07-11 NOTE — ASSESSMENT & PLAN NOTE
Patient with history of paroxysmal atrial fibrillation in the setting of her subarachnoid hemorrhage, does not appear to be chronic.  Likely related to SIRS response due to perforated diverticulitis  Was started on Cardizem in the emergency room and converted back to sinus rhythm.  Cardizem discontinued  Plan:  Remains in sinus rhythm  Continue to monitor

## 2025-07-11 NOTE — ASSESSMENT & PLAN NOTE
Met sepsis criteria as evidenced by fever, tachycardia, tachypnea, and white count with left shift  No evidence for endorgan dysfunction  IV fluids 30 mL/kg given  Antibiotics started in the emergency room-continue cefepime, vancomycin, and Flagyl  Blood cultures, UA sent  Lactic and procalcitonin ordered

## 2025-07-11 NOTE — H&P
H&P - Critical Care/ICU   Name: Cammy Prieto 55 y.o. female I MRN: [id number]  Unit/Bed#: ED 06 I Date of Admission: 7/10/2025   Date of Service: 7/10/2025 I Hospital Day: 0       Assessment & Plan  Perforation of sigmoid colon due to diverticulitis  Surgery consulted in the emergency room, favors observation on antibiotics given patient's dual antiplatelet therapy and status as a Hindu declining blood products.  Plan:  Surgery following  Serial abdominal exams  Finley catheter for decompression  Zofran for nausea vomiting, IV Tylenol for mild pain, morphine for severe pain.  Levaquin, Flagyl, vancomycin ordered in the emergency room.  Will continue Flagyl, vancomycin and add cefepime.  Currently holding Brilinta and aspirin, heparin for DVT prophylaxis until decision on surgery made      Essential hypertension  Holding home meds for now while n.p.o.  Utilize Lopressor IV as needed  Paroxysmal atrial fibrillation (HCC)  Patient with history of paroxysmal atrial fibrillation in the setting of her subarachnoid hemorrhage, does not appear to be chronic.  Likely related to SIRS response due to perforated diverticulitis  Was started on Cardizem in the emergency room and converted back to sinus rhythm.  Cardizem discontinued  Continue to monitor  MORBID (SEVERE) OBESITY DUE TO EXCESS CALORIES  Encourage lifestyle modifications  Nutrition consult    Sepsis without acute organ dysfunction (HCC)  Met sepsis criteria as evidenced by fever, tachycardia, tachypnea, and white count with left shift  No evidence for endorgan dysfunction  IV fluids 30 mL/kg given  Antibiotics started in the emergency room-continue cefepime, vancomycin, and Flagyl  Blood cultures, UA sent  Lactic and procalcitonin ordered  History of brain aneurysm-secured  Patient with history of right-sided brain aneurysms coiled and clipped.  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  Patient admits to taking her  Brilinta once a day as she forgets to take the second dose often.  Endorses short-term memory loss as a sequelae of her brain aneurysm with no motor or sensory deficits.    Plan:  Holding aspirin and Brilinta pending decision on surgery  Disposition: Critical care    History of Present Illness   Cammy Prieto is a 55 y.o. with past medical history significant for morbid obesity, hypertension, and brain aneurysm status post clipping/coiling with subarachnoid hemorrhage and associated paroxysmal atrial fibrillation who presents with lower abdominal pain since yesterday with associated nausea vomiting and diarrhea.  Patient reports fever today 102.2 at an urgent care prior to arrival.  Patient denies hematemesis, hematochezia.  CT imaging reveals likely perforated diverticulitis with small foci of pneumoperitoneum.  Patient developed rapid atrial fibrillation while in the emergency room with initiation of Cardizem.  Surgery was consulted from the emergency room and favors watchful waiting with antibiotics, particularly in the setting of aspirin and Brilinta and the patient's status as a Congregational declining blood products.  Critical care was consulted admission given her tenuous status.    History obtained from chart review and the patient.  Review of Systems: Review of Systems   Constitutional:  Positive for appetite change and fever.   Gastrointestinal:  Positive for abdominal distention, abdominal pain, diarrhea, nausea and vomiting. Negative for anal bleeding, blood in stool, constipation and rectal pain.   Genitourinary:  Negative for difficulty urinating and dysuria.   All other systems reviewed and are negative.      Historical Information   Past Medical History:  No date: Brain aneurysm  No date: Hypertension Past Surgical History:  6/26/2022: IR CEREBRAL ANGIOGRAPHY   Current Outpatient Medications   Medication Instructions    amLODIPine-valsartan (EXFORGE) 5-320 MG per tablet 1 tablet    aspirin  (ECOTRIN LOW STRENGTH) 81 mg    atorvastatin (LIPITOR) 20 mg tablet     bisoprolol-hydrochlorothiazide (ZIAC) 5-6.25 MG per tablet 1 tablet, Every morning    Brilinta 90 MG 1 tablet, 2 times daily    Allergies[1]   Social History[2] Family History[3]       Objective :                   Vitals I/O      Most Recent Min/Max in 24hrs   Temp 100.4 °F (38 °C) Temp  Min: 99.3 °F (37.4 °C)  Max: 102.2 °F (39 °C)   Pulse (!) 142 Pulse  Min: 101  Max: 146   Resp (!) 24 Resp  Min: 19  Max: 33   BP (!) 229/89 BP  Min: 148/84  Max: 229/89   O2 Sat 97 % SpO2  Min: 87 %  Max: 100 %    No intake or output data in the 24 hours ending 07/10/25 2101    Diet NPO    Invasive Monitoring           Physical Exam   Physical Exam  Vitals and nursing note reviewed.   Eyes:      General: No scleral icterus.     Extraocular Movements: Extraocular movements intact.      Conjunctiva/sclera: Conjunctivae normal.   Skin:     General: Skin is warm and dry.      Capillary Refill: Capillary refill takes less than 2 seconds.      Coloration: Skin is not jaundiced or pale.   HENT:      Head: Normocephalic and atraumatic.      Mouth/Throat:      Mouth: Mucous membranes are moist.   Neck:      Vascular: No JVD.   Cardiovascular:      Rate and Rhythm: Regular rhythm. Tachycardia present.      Pulses: Normal pulses.      Heart sounds: Normal heart sounds.   Musculoskeletal:         General: Normal range of motion.      Right lower leg: No edema.      Left lower leg: No edema.   Abdominal: General: Bowel sounds are decreased. There is distension.     Palpations: Abdomen is soft.      Tenderness: There is abdominal tenderness. There is guarding.   Constitutional:       General: She is not in acute distress.     Appearance: She is ill-appearing.   Pulmonary:      Effort: Pulmonary effort is normal.      Breath sounds: Normal breath sounds.   Secretions are normal.Chest:      Chest wall: No tenderness.   Neurological:      General: No focal deficit present.       Mental Status: She is alert and oriented to person, place and time. Mental status is at baseline. She is calm.      Sensory: Sensation is intact.      Motor: gross motor function is at baseline for patient. Strength full and intact in all extremities.   Genitourinary/Anorectal:  external catheter present.        Diagnostic Studies        Lab Results: I have reviewed the following results:     Medications:  Scheduled PRN   cefepime, 2,000 mg, Q8H  chlorhexidine, 15 mL, Q12H CELE  lactated ringers, 1,000 mL, Once   Followed by  lactated ringers, 1,000 mL, Once  metroNIDAZOLE, 500 mg, Q12H  vancomycin, 22 mg/kg (Adjusted), Once      acetaminophen, 1,000 mg, Q6H PRN  morphine injection, 2 mg, Q3H PRN  ondansetron, 4 mg, Q6H PRN       Continuous    multi-electrolyte, 125 mL/hr         Labs:   CBC    Recent Labs     07/10/25  1532   WBC 12.47*   HGB 13.3   HCT 40.1        BMP    Recent Labs     07/10/25  1532   SODIUM 137   K 3.7      CO2 26   AGAP 9   BUN 15   CREATININE 1.08   CALCIUM 9.7       Coags    No recent results     Additional Electrolytes  No recent results       Blood Gas    No recent results  No recent results LFTs  Recent Labs     07/10/25  1532   ALT 12   AST 12*   ALKPHOS 46   ALB 4.5   TBILI 1.14*       Infectious  No recent results  Glucose  Recent Labs     07/10/25  1532   GLUC 135               [1]   Allergies  Allergen Reactions    Penicillins Rash   [2]   Social History  Tobacco Use    Smoking status: Never    Smokeless tobacco: Never   Vaping Use    Vaping status: Never Used   Substance Use Topics    Alcohol use: Yes     Comment: occasionally    Drug use: Never   [3] No family history on file.

## 2025-07-11 NOTE — ASSESSMENT & PLAN NOTE
Surgery consulted in the emergency room, favors observation on antibiotics given patient's dual antiplatelet therapy and status as a Mandaeism declining blood products.  Plan:  Surgery following  Serial abdominal exams  Finley catheter for decompression  Zofran for nausea vomiting, IV Tylenol for mild pain, morphine for severe pain.  Levaquin, Flagyl, vancomycin ordered in the emergency room.  Will continue Flagyl, vancomycin and add cefepime.  Currently holding Brilinta and aspirin, heparin for DVT prophylaxis until decision on surgery made

## 2025-07-11 NOTE — ASSESSMENT & PLAN NOTE
Patient with history of paroxysmal atrial fibrillation in the setting of her subarachnoid hemorrhage, does not appear to be chronic.  Likely related to SIRS response due to perforated diverticulitis  Was started on Cardizem in the emergency room and converted back to sinus rhythm.  Cardizem discontinued  Continue to monitor

## 2025-07-11 NOTE — ASSESSMENT & PLAN NOTE
Met sepsis criteria as evidenced by fever, tachycardia, tachypnea, and white count with left shift  No evidence for endorgan dysfunction  IV fluids 30 mL/kg given  Antibiotics started in the emergency room-continue cefepime, vancomycin, and Flagyl  Blood cultures, UA sent  Lactic acid 0.9  Procalcitonin 1.06, repeat this morning

## 2025-07-11 NOTE — CONSULTS
Consultation - Surgery-General   Name: Cammy Prieto 55 y.o. female I MRN: [id number]  Unit/Bed#: ICU 11 I Date of Admission: 7/10/2025   Date of Service: 7/10/2025 I Hospital Day: 0   Inpatient consult to Acute Care Surgery  Consult performed by: Km Gray MD  Consult ordered by: Km Gray MD        Physician Requesting Evaluation: Adalgisa Small MD   Reason for Evaluation / Principal Problem: diverticulitis w/ perforation    Assessment & Plan  Perforation of sigmoid colon due to diverticulitis  NPO  IV resuscitation, follow endpoints  ABX  Hold Brillinta   ICU level of care  Discussed with patient , if decompensation will require operative intervention including possible ostomy  High risk of complications due to antiplatelet medications, Samaritan status, BMI 50  Essential hypertension    Paroxysmal atrial fibrillation (HCC)    MORBID (SEVERE) OBESITY DUE TO EXCESS CALORIES    Sepsis without acute organ dysfunction (HCC)    History of brain aneurysm-secured    I have discussed the above management plan in detail with the primary service.     History of Present Illness   Cammy Prieto is a 55 y.o. female who presents with abdominal pain x1 day. Denies N/v. Workup concerning for diverticulitis with perforation. No prior abdominal surgeries. States she think she had a colonoscopy a few years back. History notable for brain aneurysm s/p intervention in early 2020s. Now on aspirin/brillinta.Patient is a Samaritan and refuses blood products.     Review of Systems   Constitutional:  Negative for activity change, chills and fever.   HENT:  Negative for congestion, ear pain and sore throat.    Eyes:  Negative for pain and visual disturbance.   Respiratory:  Negative for chest tightness and shortness of breath.    Cardiovascular:  Negative for chest pain and palpitations.   Gastrointestinal:  Positive for abdominal pain. Negative for abdominal distention and blood in stool.    Endocrine: Negative for cold intolerance and heat intolerance.   Genitourinary:  Negative for difficulty urinating and dysuria.   Musculoskeletal:  Negative for arthralgias and myalgias.   Skin:  Negative for color change and pallor.   Neurological:  Negative for dizziness and weakness.   Hematological:  Negative for adenopathy. Does not bruise/bleed easily.   Psychiatric/Behavioral:  Negative for agitation and behavioral problems.    All other systems reviewed and are negative.    Medical History Review: I have reviewed the patient's PMH, PSH, Social History, Family History, Meds, and Allergies     Objective :  Temp:  [99.3 °F (37.4 °C)-102.2 °F (39 °C)] 100.2 °F (37.9 °C)  HR:  [] 92  BP: (148-229)/(62-91) 180/68  Resp:  [19-33] 22  SpO2:  [87 %-100 %] 97 %    Lines/Drains/Airways       Active Status       Name Placement date Placement time Site Days    Urethral Catheter Latex 16 Fr. 07/10/25  2057  Latex  less than 1                  Physical Exam  Vitals and nursing note reviewed.   Constitutional:       General: She is not in acute distress.     Appearance: She is well-developed.   HENT:      Head: Normocephalic and atraumatic.     Eyes:      Conjunctiva/sclera: Conjunctivae normal.       Cardiovascular:      Rate and Rhythm: Normal rate and regular rhythm.      Heart sounds: No murmur heard.  Pulmonary:      Effort: Pulmonary effort is normal. No respiratory distress.      Breath sounds: Normal breath sounds.   Abdominal:      Comments: Soft, diffuse tenderness more focused in LLQ/pelvis.     Musculoskeletal:         General: No swelling.      Cervical back: Neck supple.     Skin:     General: Skin is warm and dry.      Capillary Refill: Capillary refill takes less than 2 seconds.     Neurological:      Mental Status: She is alert.     Psychiatric:         Mood and Affect: Mood normal.         Lab Results: I have reviewed the following results:  Recent Labs     07/10/25  1532 07/10/25  2028   WBC  12.47*  --    HGB 13.3  --    HCT 40.1  --      --    SODIUM 137  --    K 3.7  --      --    CO2 26  --    BUN 15  --    CREATININE 1.08  --    GLUC 135  --    AST 12*  --    ALT 12  --    ALB 4.5  --    TBILI 1.14*  --    ALKPHOS 46  --    PTT  --  35*   INR  --  1.21*   LACTICACID  --  0.9     I have personally reviewed and interpreted relevant labs and imaging.    I have spent a total time of 25 minutes in caring for this patient on the day of the visit/encounter including Diagnostic results, Prognosis, Risks and benefits of tx options, Instructions for management, Patient and family education, Counseling / Coordination of care, Documenting in the medical record, and Reviewing / ordering tests, medicine, procedures  .

## 2025-07-12 LAB
ALBUMIN SERPL BCG-MCNC: 3.3 G/DL (ref 3.5–5)
ALP SERPL-CCNC: 40 U/L (ref 34–104)
ALT SERPL W P-5'-P-CCNC: 7 U/L (ref 7–52)
ANION GAP SERPL CALCULATED.3IONS-SCNC: 9 MMOL/L (ref 4–13)
AST SERPL W P-5'-P-CCNC: 10 U/L (ref 13–39)
ATRIAL RATE: 288 BPM
ATRIAL RATE: 290 BPM
ATRIAL RATE: 294 BPM
ATRIAL RATE: 294 BPM
ATRIAL RATE: 297 BPM
ATRIAL RATE: 86 BPM
ATRIAL RATE: 89 BPM
ATRIAL RATE: 93 BPM
BASOPHILS # BLD AUTO: 0.01 THOUSANDS/ÂΜL (ref 0–0.1)
BASOPHILS NFR BLD AUTO: 0 % (ref 0–1)
BILIRUB SERPL-MCNC: 0.55 MG/DL (ref 0.2–1)
BUN SERPL-MCNC: 9 MG/DL (ref 5–25)
CA-I BLD-SCNC: 1.07 MMOL/L (ref 1.12–1.32)
CALCIUM ALBUM COR SERPL-MCNC: 8.8 MG/DL (ref 8.3–10.1)
CALCIUM SERPL-MCNC: 8.2 MG/DL (ref 8.4–10.2)
CHLORIDE SERPL-SCNC: 105 MMOL/L (ref 96–108)
CO2 SERPL-SCNC: 23 MMOL/L (ref 21–32)
CREAT SERPL-MCNC: 0.79 MG/DL (ref 0.6–1.3)
EOSINOPHIL # BLD AUTO: 0.1 THOUSAND/ÂΜL (ref 0–0.61)
EOSINOPHIL NFR BLD AUTO: 1 % (ref 0–6)
ERYTHROCYTE [DISTWIDTH] IN BLOOD BY AUTOMATED COUNT: 13.8 % (ref 11.6–15.1)
GFR SERPL CREATININE-BSD FRML MDRD: 85 ML/MIN/1.73SQ M
GLUCOSE SERPL-MCNC: 115 MG/DL (ref 65–140)
HCT VFR BLD AUTO: 33.4 % (ref 34.8–46.1)
HGB BLD-MCNC: 11.2 G/DL (ref 11.5–15.4)
IMM GRANULOCYTES # BLD AUTO: 0.05 THOUSAND/UL (ref 0–0.2)
IMM GRANULOCYTES NFR BLD AUTO: 1 % (ref 0–2)
LYMPHOCYTES # BLD AUTO: 0.92 THOUSANDS/ÂΜL (ref 0.6–4.47)
LYMPHOCYTES NFR BLD AUTO: 9 % (ref 14–44)
MAGNESIUM SERPL-MCNC: 2.2 MG/DL (ref 1.9–2.7)
MCH RBC QN AUTO: 32 PG (ref 26.8–34.3)
MCHC RBC AUTO-ENTMCNC: 33.5 G/DL (ref 31.4–37.4)
MCV RBC AUTO: 95 FL (ref 82–98)
MONOCYTES # BLD AUTO: 0.59 THOUSAND/ÂΜL (ref 0.17–1.22)
MONOCYTES NFR BLD AUTO: 6 % (ref 4–12)
NEUTROPHILS # BLD AUTO: 9.03 THOUSANDS/ÂΜL (ref 1.85–7.62)
NEUTS SEG NFR BLD AUTO: 83 % (ref 43–75)
NRBC BLD AUTO-RTO: 0 /100 WBCS
P AXIS: 257 DEGREES
P AXIS: 259 DEGREES
P AXIS: 263 DEGREES
P AXIS: 267 DEGREES
P AXIS: 267 DEGREES
P AXIS: 50 DEGREES
P AXIS: 52 DEGREES
P AXIS: 56 DEGREES
PHOSPHATE SERPL-MCNC: 2.3 MG/DL (ref 2.7–4.5)
PLATELET # BLD AUTO: 178 THOUSANDS/UL (ref 149–390)
PMV BLD AUTO: 11 FL (ref 8.9–12.7)
POTASSIUM SERPL-SCNC: 3.6 MMOL/L (ref 3.5–5.3)
PR INTERVAL: 126 MS
PR INTERVAL: 128 MS
PR INTERVAL: 138 MS
PROT SERPL-MCNC: 6.7 G/DL (ref 6.4–8.4)
QRS AXIS: 1 DEGREES
QRS AXIS: 10 DEGREES
QRS AXIS: 14 DEGREES
QRS AXIS: 17 DEGREES
QRS AXIS: 4 DEGREES
QRS AXIS: 5 DEGREES
QRS AXIS: 8 DEGREES
QRS AXIS: 9 DEGREES
QRSD INTERVAL: 124 MS
QRSD INTERVAL: 134 MS
QRSD INTERVAL: 72 MS
QRSD INTERVAL: 76 MS
QRSD INTERVAL: 78 MS
QT INTERVAL: 266 MS
QT INTERVAL: 290 MS
QT INTERVAL: 332 MS
QT INTERVAL: 332 MS
QT INTERVAL: 336 MS
QT INTERVAL: 346 MS
QT INTERVAL: 358 MS
QT INTERVAL: 364 MS
QTC INTERVAL: 416 MS
QTC INTERVAL: 430 MS
QTC INTERVAL: 433 MS
QTC INTERVAL: 436 MS
QTC INTERVAL: 436 MS
QTC INTERVAL: 516 MS
QTC INTERVAL: 520 MS
QTC INTERVAL: 521 MS
RBC # BLD AUTO: 3.5 MILLION/UL (ref 3.81–5.12)
SODIUM SERPL-SCNC: 137 MMOL/L (ref 135–147)
T WAVE AXIS: -77 DEGREES
T WAVE AXIS: 241 DEGREES
T WAVE AXIS: 247 DEGREES
T WAVE AXIS: 252 DEGREES
T WAVE AXIS: 43 DEGREES
T WAVE AXIS: 50 DEGREES
T WAVE AXIS: 57 DEGREES
T WAVE AXIS: 94 DEGREES
TSH SERPL DL<=0.05 MIU/L-ACNC: 4.07 UIU/ML (ref 0.45–4.5)
VENTRICULAR RATE: 134 BPM
VENTRICULAR RATE: 144 BPM
VENTRICULAR RATE: 145 BPM
VENTRICULAR RATE: 147 BPM
VENTRICULAR RATE: 147 BPM
VENTRICULAR RATE: 86 BPM
VENTRICULAR RATE: 89 BPM
VENTRICULAR RATE: 93 BPM
WBC # BLD AUTO: 10.7 THOUSAND/UL (ref 4.31–10.16)

## 2025-07-12 PROCEDURE — 82330 ASSAY OF CALCIUM: CPT | Performed by: PHYSICIAN ASSISTANT

## 2025-07-12 PROCEDURE — 84100 ASSAY OF PHOSPHORUS: CPT | Performed by: PHYSICIAN ASSISTANT

## 2025-07-12 PROCEDURE — 93010 ELECTROCARDIOGRAM REPORT: CPT | Performed by: INTERNAL MEDICINE

## 2025-07-12 PROCEDURE — 99222 1ST HOSP IP/OBS MODERATE 55: CPT | Performed by: INTERNAL MEDICINE

## 2025-07-12 PROCEDURE — 99232 SBSQ HOSP IP/OBS MODERATE 35: CPT | Performed by: FAMILY MEDICINE

## 2025-07-12 PROCEDURE — 80053 COMPREHEN METABOLIC PANEL: CPT | Performed by: PHYSICIAN ASSISTANT

## 2025-07-12 PROCEDURE — 93005 ELECTROCARDIOGRAM TRACING: CPT

## 2025-07-12 PROCEDURE — 84443 ASSAY THYROID STIM HORMONE: CPT | Performed by: PHYSICIAN ASSISTANT

## 2025-07-12 PROCEDURE — 83735 ASSAY OF MAGNESIUM: CPT | Performed by: PHYSICIAN ASSISTANT

## 2025-07-12 PROCEDURE — 85025 COMPLETE CBC W/AUTO DIFF WBC: CPT | Performed by: PHYSICIAN ASSISTANT

## 2025-07-12 PROCEDURE — 99232 SBSQ HOSP IP/OBS MODERATE 35: CPT | Performed by: PHYSICIAN ASSISTANT

## 2025-07-12 RX ORDER — DILTIAZEM HYDROCHLORIDE 5 MG/ML
10 INJECTION INTRAVENOUS ONCE AS NEEDED
Status: COMPLETED | OUTPATIENT
Start: 2025-07-12 | End: 2025-07-13

## 2025-07-12 RX ORDER — DILTIAZEM HYDROCHLORIDE 5 MG/ML
10 INJECTION INTRAVENOUS ONCE
Status: DISCONTINUED | OUTPATIENT
Start: 2025-07-12 | End: 2025-07-14

## 2025-07-12 RX ORDER — METOPROLOL TARTRATE 1 MG/ML
5 INJECTION, SOLUTION INTRAVENOUS EVERY 6 HOURS
Status: DISCONTINUED | OUTPATIENT
Start: 2025-07-12 | End: 2025-07-14

## 2025-07-12 RX ADMIN — CEFEPIME 2000 MG: 2 INJECTION, POWDER, FOR SOLUTION INTRAVENOUS at 08:49

## 2025-07-12 RX ADMIN — MORPHINE SULFATE 2 MG: 2 INJECTION, SOLUTION INTRAMUSCULAR; INTRAVENOUS at 18:25

## 2025-07-12 RX ADMIN — SODIUM CHLORIDE, SODIUM GLUCONATE, SODIUM ACETATE, POTASSIUM CHLORIDE, MAGNESIUM CHLORIDE, SODIUM PHOSPHATE, DIBASIC, AND POTASSIUM PHOSPHATE 125 ML/HR: .53; .5; .37; .037; .03; .012; .00082 INJECTION, SOLUTION INTRAVENOUS at 20:57

## 2025-07-12 RX ADMIN — CEFEPIME 2000 MG: 2 INJECTION, POWDER, FOR SOLUTION INTRAVENOUS at 22:27

## 2025-07-12 RX ADMIN — CHLORHEXIDINE GLUCONATE 15 ML: 1.2 SOLUTION ORAL at 20:49

## 2025-07-12 RX ADMIN — METOROPROLOL TARTRATE 5 MG: 5 INJECTION, SOLUTION INTRAVENOUS at 18:24

## 2025-07-12 RX ADMIN — PANTOPRAZOLE SODIUM 40 MG: 40 INJECTION, POWDER, FOR SOLUTION INTRAVENOUS at 23:17

## 2025-07-12 RX ADMIN — HEPARIN SODIUM 7500 UNITS: 5000 INJECTION INTRAVENOUS; SUBCUTANEOUS at 22:23

## 2025-07-12 RX ADMIN — METRONIDAZOLE 500 MG: 500 INJECTION, SOLUTION INTRAVENOUS at 17:48

## 2025-07-12 RX ADMIN — METOROPROLOL TARTRATE 5 MG: 5 INJECTION, SOLUTION INTRAVENOUS at 23:17

## 2025-07-12 RX ADMIN — LABETALOL HYDROCHLORIDE 10 MG: 5 INJECTION, SOLUTION INTRAVENOUS at 02:05

## 2025-07-12 RX ADMIN — HEPARIN SODIUM 7500 UNITS: 5000 INJECTION INTRAVENOUS; SUBCUTANEOUS at 14:23

## 2025-07-12 RX ADMIN — HEPARIN SODIUM 7500 UNITS: 5000 INJECTION INTRAVENOUS; SUBCUTANEOUS at 05:25

## 2025-07-12 RX ADMIN — CEFEPIME 2000 MG: 2 INJECTION, POWDER, FOR SOLUTION INTRAVENOUS at 16:00

## 2025-07-12 RX ADMIN — MORPHINE SULFATE 2 MG: 2 INJECTION, SOLUTION INTRAMUSCULAR; INTRAVENOUS at 03:42

## 2025-07-12 RX ADMIN — METRONIDAZOLE 500 MG: 500 INJECTION, SOLUTION INTRAVENOUS at 05:26

## 2025-07-12 RX ADMIN — CHLORHEXIDINE GLUCONATE 15 ML: 1.2 SOLUTION ORAL at 08:49

## 2025-07-12 RX ADMIN — ACETAMINOPHEN 1000 MG: 10 INJECTION INTRAVENOUS at 20:45

## 2025-07-12 RX ADMIN — ACETAMINOPHEN 1000 MG: 10 INJECTION INTRAVENOUS at 00:51

## 2025-07-12 RX ADMIN — METOROPROLOL TARTRATE 5 MG: 5 INJECTION, SOLUTION INTRAVENOUS at 11:12

## 2025-07-12 RX ADMIN — METOROPROLOL TARTRATE 5 MG: 5 INJECTION, SOLUTION INTRAVENOUS at 04:19

## 2025-07-12 RX ADMIN — ACETAMINOPHEN 1000 MG: 10 INJECTION INTRAVENOUS at 10:10

## 2025-07-12 RX ADMIN — PANTOPRAZOLE SODIUM 40 MG: 40 INJECTION, POWDER, FOR SOLUTION INTRAVENOUS at 11:15

## 2025-07-12 NOTE — ASSESSMENT & PLAN NOTE
History of PAF during brain aneurysm coiling and stenting  Was not previously taking anticoagulation  Now appears to have new onset atrial flutter paroxysmally  Currently patient is in sinus with heart rate in the 70s to 80s  Patient asymptomatic  Discussed pathophysiology of atrial flutter with the patient; all questions answered  Continue with metoprolol 5 mg IV Q6 for rate control  Patient will need oral rate control when able to take oral (could plan to resume her bisoprolol/HCTZ)  Patient ideally would need oral anticoagulation, [id number] (female, htn) BUT would hold off at this time given current diagnosis of perforated diverticulitis, also with hx of SAH  Advised to follow-up with primary cardiologist in [facility]  Also advised she would also need to be evaluated by electrophysiology to discuss treatment options of a flutter including ablation therapy and/or Watchman   Patient had recent echocardiogram done in [place] 6/20/25: EF 63%, trivial MR, mild TR, trivial RI (no need to repeat at this time)  TSH 4.06

## 2025-07-12 NOTE — ASSESSMENT & PLAN NOTE
Pt admitted to ICU after development of rapid a fib requiring Cardizem and close monitoring         NPO,     Discussed with patient that she has improved with IVF, antibiotics, and bowel rest within the first 24 hours, she will continue conservative management.   Discussed with patient , if decompensation will require operative intervention including possible ostomy  High risk of complications due to antiplatelet medications, Mosque status, BMI 50  Advance diet when okay with surgery but the patient has not had any bowel movements yet.  Encouraged ambulation  Complaining of headache could be secondary to n.p.o. status plus Flagyl

## 2025-07-12 NOTE — ASSESSMENT & PLAN NOTE
Quite elevated on admission 210/91  Now stable 122/70  Continue with metoprolol 5 mg IV every 6  Plan to resume home antihypertensives when able to take oral (patient was previously taking Exforge 5/320 as well as Ziac 5/6.25

## 2025-07-12 NOTE — PROGRESS NOTES
Progress Note - Hospitalist   Name: Cammy Prieto 55 y.o. female I MRN: [id number]  Unit/Bed#: -01 I Date of Admission: 7/10/2025   Date of Service: 7/12/2025 I Hospital Day: 2    Assessment & Plan  Perforation of sigmoid colon due to diverticulitis    Pt admitted to ICU after development of rapid a fib requiring Cardizem and close monitoring         NPO,     Discussed with patient that she has improved with IVF, antibiotics, and bowel rest within the first 24 hours, she will continue conservative management.   Discussed with patient , if decompensation will require operative intervention including possible ostomy  High risk of complications due to antiplatelet medications, Mormonism status, BMI 50  Advance diet when okay with surgery but the patient has not had any bowel movements yet.  Encouraged ambulation  Complaining of headache could be secondary to n.p.o. status plus Flagyl  Essential hypertension  Quite elevated on admission 210/91  Now stable 122/70  Continue with metoprolol 5 mg IV every 6  Plan to resume home antihypertensives when able to take oral (patient was previously taking Exforge 5/320 as well as Ziac 5/6.25  Paroxysmal atrial fibrillation (HCC)  History of PAF during brain aneurysm coiling and stenting  Was not previously taking anticoagulation  Now appears to have new onset atrial flutter paroxysmally  Currently patient is in sinus with heart rate in the 70s to 80s  Cardiology consultation appreciated.  The patient is also on Brilinta as an outpatient but held secondary to being n.p.o.  This will need to be started but this is for a brain stent.  Cardiologist as an outpatient never spoke about anticoagulation.  Should consider once abdominal issues improved.  Patient is also a Yazidi so we will need to be cognizant of any potential bleeding issues  MORBID (SEVERE) OBESITY DUE TO EXCESS CALORIES  Encourage weight loss  Sepsis without acute organ dysfunction (HCC)  On  antibiotics  Continue to monitor.  This secondary to the diverticulitis with perforation  History of brain aneurysm-secured  Status post ALEXUS stent and coiling  Was previously on Brilinta, currently on hold  Refusal of blood product  Patient is a Mosque    VTE Pharmacologic Prophylaxis:   High Risk (Score >/= 5) - Pharmacological DVT Prophylaxis Ordered: heparin. Sequential Compression Devices Ordered.    Mobility:   Basic Mobility Inpatient Raw Score: 18  JH-HLM Goal: 6: Walk 10 steps or more  JH-HLM Achieved: 7: Walk 25 feet or more  JH-HLM Goal achieved. Continue to encourage appropriate mobility.    Patient Centered Rounds: I performed bedside rounds with nursing staff today.   Discussions with Specialists or Other Care Team Provider: Nursing    Education and Discussions with Family / Patient: Patient.     Current Length of Stay: 2 day(s)  Current Patient Status: Inpatient   Certification Statement: The patient will continue to require additional inpatient hospital stay due to still being n.p.o. with a perforated diverticulitis in need of IV antibiotics  Discharge Plan: Anticipate discharge in 48-72 hrs to discharge location to be determined pending rehab evaluations.    Code Status: Level 1 - Full Code    Subjective   Patient seen and examined.  Complaining of some abdominal discomfort.  Also states she has not had a bowel movement or passing gas yet.  Complains of a headache    Objective :  Temp:  [97.9 °F (36.6 °C)-101.8 °F (38.8 °C)] 97.9 °F (36.6 °C)  HR:  [] 108  BP: (114-181)/() 176/95  Resp:  [18-32] 18  SpO2:  [90 %-95 %] 94 %  O2 Device: None (Room air)    Body mass index is 51.36 kg/m².     Input and Output Summary (last 24 hours):     Intake/Output Summary (Last 24 hours) at 7/12/2025 1417  Last data filed at 7/12/2025 1317  Gross per 24 hour   Intake 886.21 ml   Output 2125 ml   Net -1238.79 ml       Physical Exam  General Appearance:    Alert, cooperative, no distress, appears  stated age                               Lungs:     Clear to auscultation bilaterally, respirations unlabored       Heart:    Regular rate and rhythm, S1 and S2 normal, no murmur, rub    or gallop   Abdomen:   I did not appreciate a lot of bowel sounds.  Diffuse tenderness to palpation with no rebound or guarding           Extremities:   Extremities normal, atraumatic, no cyanosis or edema                         Lines/Drains:  Lines/Drains/Airways       Active Status       Name Placement date Placement time Site Days    Urethral Catheter Latex 16 Fr. 07/10/25  2057  Latex  1                  Urinary Catheter:  Goal for removal: Remove after 48 hrs of I/O monitoring           Telemetry:  Telemetry Orders (From admission, onward)               24 Hour Telemetry Monitoring  Continuous x 24 Hours (Telem)        Expiring   Question:  Reason for 24 Hour Telemetry  Answer:  Arrhythmias requiring acute medical intervention / PPM or ICD malfunction                     Telemetry Reviewed: Normal Sinus Rhythm  Indication for Continued Telemetry Use: Arrthymias requiring medical therapy               Lab Results: I have reviewed the following results:   Results from last 7 days   Lab Units 07/12/25  0507   WBC Thousand/uL 10.70*   HEMOGLOBIN g/dL 11.2*   HEMATOCRIT % 33.4*   PLATELETS Thousands/uL 178   SEGS PCT % 83*   LYMPHO PCT % 9*   MONO PCT % 6   EOS PCT % 1     Results from last 7 days   Lab Units 07/12/25  0507   SODIUM mmol/L 137   POTASSIUM mmol/L 3.6   CHLORIDE mmol/L 105   CO2 mmol/L 23   BUN mg/dL 9   CREATININE mg/dL 0.79   ANION GAP mmol/L 9   CALCIUM mg/dL 8.2*   ALBUMIN g/dL 3.3*   TOTAL BILIRUBIN mg/dL 0.55   ALK PHOS U/L 40   ALT U/L 7   AST U/L 10*   GLUCOSE RANDOM mg/dL 115     Results from last 7 days   Lab Units 07/10/25  2028   INR  1.21*             Results from last 7 days   Lab Units 07/11/25  0521 07/10/25  2028   LACTIC ACID mmol/L  --  0.9   PROCALCITONIN ng/ml 1.31* 1.06*       Recent Cultures  (last 7 days):   Results from last 7 days   Lab Units 07/10/25  2113   BLOOD CULTURE  No Growth at 24 hrs.  No Growth at 24 hrs.       Imaging Results Review: No pertinent imaging studies reviewed.  Other Study Results Review: No additional pertinent studies reviewed.    Last 24 Hours Medication List:     Current Facility-Administered Medications:     acetaminophen (Ofirmev) injection 1,000 mg, Q6H PRN, Last Rate: 1,000 mg (07/12/25 1010)    cefepime (MAXIPIME) 2 g/50 mL dextrose IVPB, Q8H, Last Rate: 2,000 mg (07/12/25 0849)    chlorhexidine (PERIDEX) 0.12 % oral rinse 15 mL, Q12H CELE    diltiazem (CARDIZEM) injection 10 mg, Once    heparin (porcine) subcutaneous injection 7,500 Units, Q8H CELE    labetalol (NORMODYNE) injection 10 mg, Q4H PRN    metoprolol (LOPRESSOR) injection 5 mg, Q6H    metroNIDAZOLE (FLAGYL) IVPB (premix) 500 mg 100 mL, Q12H, Last Rate: 500 mg (07/12/25 0526)    morphine injection 2 mg, Q3H PRN    multi-electrolyte (Plasmalyte-A/Isolyte-S PH 7.4/Normosol-R) IV solution, Continuous, Last Rate: 125 mL/hr (07/11/25 2122)    ondansetron (ZOFRAN) injection 4 mg, Q6H PRN    pantoprazole (PROTONIX) injection 40 mg, Q12H CELE    [Held by provider] ticagrelor (BRILINTA) tablet 90 mg, BID    Administrative Statements   Today, Patient Was Seen By: Evert Ugalde MD  I have spent a total time of 28 minutes in caring for this patient on the day of the visit/encounter including Diagnostic results, Risks and benefits of tx options, Instructions for management, Patient and family education, Importance of tx compliance, Risk factor reductions, Impressions, Counseling / Coordination of care, Documenting in the medical record, Reviewing/placing orders in the medical record (including tests, medications, and/or procedures), Obtaining or reviewing history  , and Communicating with other healthcare professionals .    **Please Note: This note may have been constructed using a voice recognition system.**

## 2025-07-12 NOTE — NURSING NOTE
Message was sent to Dr Ugalde that the patient's telemetry monitoring . I did not get a response. Sent message to SLIM to reorder the telemetry, no response. I will communicate to oncoming nurse to follow up.

## 2025-07-12 NOTE — PROGRESS NOTES
Progress Note - Surgery-General   Name: Cammy Prieto 55 y.o. female I MRN: [id number]  Unit/Bed#: -01 I Date of Admission: 7/10/2025   Date of Service: 7/12/2025 I Hospital Day: 2    Assessment & Plan  Perforation of sigmoid colon due to diverticulitis    Pt admitted to ICU after development of rapid a fib requiring Cardizem and close monitoring   No bowel function. WBC 11.22 from 12.4  on admission  Procalcitonin 1.31, 1.06   Phosphorus 2.2  Magnesium 1.8   Pt is Taoist and refuses blood products and albumin.   H/o multiple intracerebral aneurysms, on Brillinta.   VSS with the exception of respiratory rate 20-20. Pt with GONZALO and did not have CPAP on last night, she is not tachypneic, she is sitting comfortably in the chair, O2 sat 95%     Pt having fullness after a few ice chips, less pain, no bowel function,   Uncontrolled a Fic/flutter, pt feels very tired, management per cardiology     NPO,   Ok to transfer to [facility] for management of a fib, surgery on consult   IV resuscitation, follow endpoints  Replete electrolytes as needed   ABX :cefepime and flagyl   Hold Brillinta   ICU level of care for cardiac management of new onset of atrial flutter,   Discussed with patient that she has improved with IVF, antibiotics, and bowel rest within the first 24 hours, she will continue conservative management.   Discussed with patient , if decompensation will require operative intervention including possible ostomy  High risk of complications due to antiplatelet medications, Rastafari status, BMI 50  Pt will have her nephew bring in her CPAP from home    Essential hypertension  Per SLIM   Paroxysmal atrial fibrillation (HCC)  Per SLIM   MORBID (SEVERE) OBESITY DUE TO EXCESS CALORIES  Per SLIM   Sepsis without acute organ dysfunction (HCC)  Per SLIM   History of brain aneurysm-secured  Per SLIM   Refusal of blood product  Per SLIM         Subjective   I feel so tired with my heart rate up and down.  It is wearing me out.  I feel full after having just a few ice chips, no gas or BM yet      Objective :  Temp:  [98.4 °F (36.9 °C)-101.8 °F (38.8 °C)] 98.4 °F (36.9 °C)  HR:  [] 90  BP: (114-174)/() 135/82  Resp:  [18-33] 18  SpO2:  [90 %-96 %] 95 %  O2 Device: None (Room air)    I/O         07/10 0701  07/11 0700 07/11 0701  07/12 0700    P.O. 0     I.V. (mL/kg) 1085.4 (7.8) 186.2 (1.3)    IV Piggyback  700    Total Intake(mL/kg) 1085.4 (7.8) 886.2 (6.4)    Urine (mL/kg/hr) 705 675 (0.2)    Total Output 705 675    Net +380.4 +211.2                Lines/Drains/Airways       Active Status       Name Placement date Placement time Site Days    Urethral Catheter Latex 16 Fr. 07/10/25  2057  Latex  1                  Physical Exam  Constitutional:       Comments: Alert, appears tired      Cardiovascular:      Comments: Current rate 147,     Abdominal:      Comments: Soft, mild tenderness in the lower right and left abdomen.   Hypoactive bowel sounds      Musculoskeletal:      Comments: No calf tenderness      Skin:     General: Skin is warm and dry.     Neurological:      General: No focal deficit present.      Mental Status: She is oriented to person, place, and time.     Psychiatric:         Mood and Affect: Mood normal.         Behavior: Behavior normal.           Lab Results: I have reviewed the following results:  Recent Labs     07/10/25  2028 07/11/25  0521 07/11/25  0541   WBC  --   --  11.22*   HGB  --   --  10.7*   HCT  --   --  33.9*   PLT  --   --  174   SODIUM  --  139  --    K  --  3.6  --    CL  --  106  --    CO2  --  26  --    BUN  --  10  --    CREATININE  --  0.97  --    GLUC  --  128  --    CAIONIZED  --  1.06*  --    MG  --  1.8*  --    PHOS  --  2.2*  --    AST  --  9*  --    ALT  --  8  --    ALB  --  3.5  --    TBILI  --  0.91  --    ALKPHOS  --  38  --    PTT 35*  --   --    INR 1.21*  --   --    LACTICACID 0.9  --   --        Ese HUNTLEY

## 2025-07-12 NOTE — CASE MANAGEMENT
Case Management Assessment & Discharge Planning Note    Patient name Cammy Prieto  Location /-01 MRN [id number]  : 1970 Date 2025       Current Admission Date: 7/10/2025  Current Admission Diagnosis:Perforation of sigmoid colon due to diverticulitis   Patient Active Problem List    Diagnosis Date Noted    Refusal of blood product 2025    MORBID (SEVERE) OBESITY DUE TO EXCESS CALORIES 07/10/2025    Perforation of sigmoid colon due to diverticulitis 07/10/2025    Sepsis without acute organ dysfunction (HCC) 07/10/2025    History of brain aneurysm-secured 07/10/2025    Obstructive sleep apnea syndrome 2023    Family history of chronic heart failure 2022    Mild pulmonary hypertension (HCC) 2022    Regional wall motion abnormality of heart 2022    Essential hypertension 2022    Obesity 2022    Paroxysmal atrial fibrillation (HCC) 2022    Transient cerebral ischemia 2022    Subarachnoid hemorrhage (HCC) 2022    Intracranial bleeding (HCC) 2022      LOS (days): 2  Geometric Mean LOS (GMLOS) (days): 3.5  Days to GMLOS:1.8     OBJECTIVE:    Risk of Unplanned Readmission Score: 15.5         Current admission status: Inpatient  Referral Reason: Other (disposition)    Preferred Pharmacy:   [facility] #[id number] - [place] - [address]  [address]  [facility] [address]  Phone: [phone] Fax: [phone]    Primary Care Provider: Amisha Sung    Primary Insurance: BLUE CROSS  Secondary Insurance:     ASSESSMENT:  Active Health Care Proxies       Kelli Head Health Care Agent - Friend   Primary Phone: [phone] (Mobile)                 Advance Directives  Does patient have a Health Care POA?: Yes  Does patient have Advance Directives?: Yes  Advance Directives: Power of  for health care  Primary Contact: Kelli Head         Readmission Root Cause  30 Day Readmission: No    Patient  Information  Admitted from:: Home  Mental Status: Alert  During Assessment patient was accompanied by: Not accompanied during assessment  Assessment information provided by:: Patient  Primary Caregiver: Self  Support Systems: Self, Family members, Gnosticism/yandel community  County of Residence: [place]  What city do you live in[place]  Home entry access options. Select all that apply.: Stairs  Number of steps to enter home.: One Flight  Do the steps have railings?: Yes  Type of Current Residence: Apartment  Floor Level: 2  Upon entering residence, is there a bedroom on the main floor (no further steps)?: Yes  Upon entering residence, is there a bathroom on the main floor (no further steps)?: Yes  Living Arrangements: Lives w/ Family members  Is patient a ?: No    Activities of Daily Living Prior to Admission  Functional Status: Independent  Completes ADLs independently?: Yes  Ambulates independently?: Yes  Does patient use assisted devices?: No  Does patient currently own DME?: No  Does patient have a history of Outpatient Therapy (PT/OT)?: Yes  Does the patient have a history of Short-Term Rehab?: Yes  Does patient have a history of HHC?: No  Does patient currently have HHC?: No         Patient Information Continued  Income Source: Unemployed  Does patient have prescription coverage?: Yes  Can the patient afford their medications and any related supplies (such as glucometers or test strips)?: Yes  Does patient receive dialysis treatments?: No  Does patient have a history of substance abuse?: No  Does patient have a history of Mental Health Diagnosis?: No         Means of Transportation  Means of Transport to Appts:: Family transport          DISCHARGE DETAILS:    Discharge planning discussed with:: Patient  Freedom of Choice: Yes  Comments - Freedom of Choice: CM introduced self/role and discussed FOC with pt who demonstrated understanding and had no questions. IPTA, lives with nephews, no DME, and is  refusing STR and HHC stating she doesn't need it. Pt is asking if there is anything we can do to help he rwith her ambulance bill. CM to f/u.  CM contacted family/caregiver?: No- see comments (declined)  Were Treatment Team discharge recommendations reviewed with patient/caregiver?: Yes  Did patient/caregiver verbalize understanding of patient care needs?: Yes  Were patient/caregiver advised of the risks associated with not following Treatment Team discharge recommendations?: Yes         Requested Home Health Care         Is the patient interested in HHC at discharge?: No    DME Referral Provided  Referral made for DME?: No    Other Referral/Resources/Interventions Provided:  Interventions: Declines resources         Treatment Team Recommendation: Home  Expected Discharge Disposition: Home or Self Care  Additional Discharge Dispositions: Home or Self Care  Transport at Discharge : Family

## 2025-07-12 NOTE — PLAN OF CARE
Problem: DISCHARGE PLANNING  Goal: Discharge to home or other facility with appropriate resources  Description: INTERVENTIONS:  - Identify barriers to discharge w/patient and caregiver  - Arrange for needed discharge resources and transportation as appropriate  - Identify discharge learning needs (meds, wound care, etc.)  - Arrange for interpretive services to assist at discharge as needed  - Refer to Case Management Department for coordinating discharge planning if the patient needs post-hospital services based on physician/advanced practitioner order or complex needs related to functional status, cognitive ability, or social support system  Outcome: Progressing     Problem: Knowledge Deficit  Goal: Patient/family/caregiver demonstrates understanding of disease process, treatment plan, medications, and discharge instructions  Description: Complete learning assessment and assess knowledge base.  Interventions:  - Provide teaching at level of understanding  - Provide teaching via preferred learning methods  Outcome: Progressing     Problem: Nutrition/Hydration-ADULT  Goal: Nutrient/Hydration intake appropriate for improving, restoring or maintaining nutritional needs  Description: Monitor and assess patient's nutrition/hydration status for malnutrition. Collaborate with interdisciplinary team and initiate plan and interventions as ordered.  Monitor patient's weight and dietary intake as ordered or per policy. Utilize nutrition screening tool and intervene as necessary. Determine patient's food preferences and provide high-protein, high-caloric foods as appropriate.     INTERVENTIONS:  - Monitor oral intake, urinary output, labs, and treatment plans  - Assess nutrition and hydration status and recommend course of action  - Evaluate amount of meals eaten  - Assist patient with eating if necessary   - Allow adequate time for meals  - Recommend/ encourage appropriate diets, oral nutritional supplements, and  vitamin/mineral supplements  - Order, calculate, and assess calorie counts as needed  - Recommend, monitor, and adjust tube feedings and TPN/PPN based on assessed needs  - Assess need for intravenous fluids  - Provide specific nutrition/hydration education as appropriate  - Include patient/family/caregiver in decisions related to nutrition  Outcome: Progressing     Problem: Nutrition/Hydration-ADULT  Goal: Nutrient/Hydration intake appropriate for improving, restoring or maintaining nutritional needs  Description: Monitor and assess patient's nutrition/hydration status for malnutrition. Collaborate with interdisciplinary team and initiate plan and interventions as ordered.  Monitor patient's weight and dietary intake as ordered or per policy. Utilize nutrition screening tool and intervene as necessary. Determine patient's food preferences and provide high-protein, high-caloric foods as appropriate.     INTERVENTIONS:  - Monitor oral intake, urinary output, labs, and treatment plans  - Assess nutrition and hydration status and recommend course of action  - Evaluate amount of meals eaten  - Assist patient with eating if necessary   - Allow adequate time for meals  - Recommend/ encourage appropriate diets, oral nutritional supplements, and vitamin/mineral supplements  - Order, calculate, and assess calorie counts as needed  - Recommend, monitor, and adjust tube feedings and TPN/PPN based on assessed needs  - Assess need for intravenous fluids  - Provide specific nutrition/hydration education as appropriate  - Include patient/family/caregiver in decisions related to nutrition  Outcome: Progressing

## 2025-07-12 NOTE — ASSESSMENT & PLAN NOTE
Pt admitted to ICU after development of rapid a fib requiring Cardizem and close monitoring   No bowel function. WBC 11.22 from 12.4  on admission  Procalcitonin 1.31, 1.06   Phosphorus 2.2  Magnesium 1.8   Pt is Religious and refuses blood products and albumin.   H/o multiple intracerebral aneurysms, on Brillinta.   VSS with the exception of respiratory rate 20-20. Pt with GONZALO and did not have CPAP on last night, she is not tachypneic, she is sitting comfortably in the chair, O2 sat 95%     Pt having fullness after a few ice chips, less pain, no bowel function,   Uncontrolled a Fic/flutter, pt feels very tired, management per cardiology     NPO,   Ok to transfer to [facility] for management of a fib, surgery on consult   IV resuscitation, follow endpoints  Replete electrolytes as needed   ABX :cefepime and flagyl   Hold Brillinta   ICU level of care for cardiac management of new onset of atrial flutter,   Discussed with patient that she has improved with IVF, antibiotics, and bowel rest within the first 24 hours, she will continue conservative management.   Discussed with patient , if decompensation will require operative intervention including possible ostomy  High risk of complications due to antiplatelet medications, Pentecostal status, BMI 50  Pt will have her nephew bring in her CPAP from home

## 2025-07-12 NOTE — ASSESSMENT & PLAN NOTE
History of PAF during brain aneurysm coiling and stenting  Was not previously taking anticoagulation  Now appears to have new onset atrial flutter paroxysmally  Currently patient is in sinus with heart rate in the 70s to 80s  Cardiology consultation appreciated.  The patient is also on Brilinta as an outpatient but held secondary to being n.p.o.  This will need to be started but this is for a brain stent.  Cardiologist as an outpatient never spoke about anticoagulation.  Should consider once abdominal issues improved.  Patient is also a Alevism so we will need to be cognizant of any potential bleeding issues

## 2025-07-12 NOTE — CONSULTS
Consultation - Cardiology   Name: Cammy Prieto 55 y.o. female I MRN: [id number]  Unit/Bed#: -01 I Date of Admission: 7/10/2025   Date of Service: 7/12/2025 I Hospital Day: 2   Inpatient consult to Cardiology  Consult performed by: Cammy Sunshine PA-C  Consult ordered by: Evert Ugalde MD        Physician Requesting Evaluation: Evert Ugalde MD   Reason for Evaluation / Principal Problem: aflutter    Assessment & Plan  Paroxysmal atrial fibrillation (HCC)  History of PAF during brain aneurysm coiling and stenting  Was not previously taking anticoagulation  Now appears to have new onset atrial flutter paroxysmally  Currently patient is in sinus with heart rate in the 70s to 80s  Patient asymptomatic  Discussed pathophysiology of atrial flutter with the patient; all questions answered  Continue with metoprolol 5 mg IV Q6 for rate control  Patient will need oral rate control when able to take oral (could plan to resume her bisoprolol/HCTZ)  Patient ideally would need oral anticoagulation, [id number]=5 (female, htn) BUT would hold off at this time given current diagnosis of perforated diverticulitis, also with hx of SAH  Advised to follow-up with primary cardiologist in [facility]  Also advised she would also need to be evaluated by electrophysiology to discuss treatment options of a flutter including ablation therapy and/or Watchman   Patient had recent echocardiogram done in [place] 6/20/25: EF 63%, trivial MR, mild TR, trivial NH (no need to repeat at this time)  TSH 4.06  Perforation of sigmoid colon due to diverticulitis  Conservative management planned at this time  Continue with n.p.o. and bowel rest  On antibiotics  Management per SLIM and general surgery  Essential hypertension  Quite elevated on admission 210/91  Now stable 122/70  Continue with metoprolol 5 mg IV every 6  Plan to resume home antihypertensives when able to take oral (patient was previously taking Exforge 5/320 as well as  Ziac 5/6.25  MORBID (SEVERE) OBESITY DUE TO EXCESS CALORIES  Weight loss advised  Sepsis without acute organ dysfunction (HCC)  On antibiotics  Management per SLIM and general surgery  History of brain aneurysm-secured  Status post ALEXUS stent and coiling  Was previously on Brilinta, currently on hold  Refusal of blood product  History of Sabianism  I have discussed the above management plan in detail with the primary service.   Cardiology service signing off.    History of Present Illness   Cammy Prieto is a 55 y.o. female who presented on 7/10/25 with complaints of lower abdominal pain associated with nausea, vomiting, diarrhea.  Patient was seen at urgent care who also noted patient to be febrile 102.2 and advised patient to come to the emergency room.  CT scan revealed perforated diverticulitis with small focus of pneumoperitoneum.  Patient also had rapid atrial fibrillation while in the emergency room that required Cardizem.  General surgery at that time favored watchful waiting with antibiotics.  Patient was placed in the ICU.  Patient has seemingly improved and is now on the regular floor.  Patient states last night she was sleeping, feeling well when she was suddenly woken up by hospital provider who noted patient was in rapid a flutter.  Patient states she had been feeling well with no complaints of palpitations, dizziness, chest pain or shortness of breath.  Patient notes a brief history of A-fib while she was hospitalized for her brain aneurysm coiling and stenting.  Patient states her outpatient cardiologist in [place] never talked about placing her on blood thinners.  She was only on Brilinta for history of brain stent.  Brilinta is currently on hold given current issues of perforated diverticulum.  Patient states her only complaint is fatigue.    PMH: Obesity, hypertension, brain aneurysm status post clipping, coiling, stenting, subarachnoid hemorrhage, PAF during that time,  hypertension    Review of Systems   Constitutional:  Positive for fatigue.   Respiratory: Negative.     Gastrointestinal:  Positive for abdominal pain, diarrhea, nausea and vomiting.   Neurological: Negative.    Hematological: Negative.    Psychiatric/Behavioral: Negative.     All other systems reviewed and are negative.    Medical History Review: I have reviewed the patient's PMH, PSH, Social History, Family History, Meds, and Allergies     Objective :  Temp:  [98.1 °F (36.7 °C)-101.8 °F (38.8 °C)] 98.4 °F (36.9 °C)  HR:  [] 149  BP: (114-181)/() 173/94  Resp:  [18-32] 18  SpO2:  [90 %-95 %] 94 %  O2 Device: None (Room air)  Orthostatic Blood Pressures      Flowsheet Row Most Recent Value   Blood Pressure 173/94 filed at 07/12/2025 1113   Patient Position - Orthostatic VS Lying filed at 07/11/2025 2001          First Weight: Weight - Scale: (!) 140 kg (307 lb 8.7 oz) (07/10/25 1503)  Vitals:    07/10/25 2149 07/11/25 0544   Weight: (!) 137 kg (302 lb 14.6 oz) (!) 139 kg (306 lb)       Physical Exam  Vitals and nursing note reviewed. Exam conducted with a chaperone present.   Constitutional:       Appearance: She is ill-appearing.     Cardiovascular:      Rate and Rhythm: Normal rate and regular rhythm.      Pulses: Normal pulses.      Heart sounds: Normal heart sounds.   Pulmonary:      Effort: Pulmonary effort is normal.      Breath sounds: Normal breath sounds.   Abdominal:      Palpations: Abdomen is soft.      Tenderness: There is abdominal tenderness.     Musculoskeletal:         General: Normal range of motion.      Cervical back: Normal range of motion and neck supple.     Skin:     General: Skin is warm and dry.     Neurological:      General: No focal deficit present.      Mental Status: She is alert and oriented to person, place, and time.     Psychiatric:         Mood and Affect: Mood normal.         Behavior: Behavior normal.         Thought Content: Thought content normal.         Judgment:  "Judgment normal.           Lab Results: I have reviewed the following results:  Results from last 7 days   Lab Units 07/12/25  0507 07/11/25  0541 07/10/25  1532   WBC Thousand/uL 10.70* 11.22* 12.47*   HEMOGLOBIN g/dL 11.2* 10.7* 13.3   HEMATOCRIT % 33.4* 33.9* 40.1   PLATELETS Thousands/uL 178 174 236     Results from last 7 days   Lab Units 07/12/25  0507 07/11/25  0521 07/10/25  1532   POTASSIUM mmol/L 3.6 3.6 3.7   CHLORIDE mmol/L 105 106 102   CO2 mmol/L 23 26 26   BUN mg/dL 9 10 15   CREATININE mg/dL 0.79 0.97 1.08   CALCIUM mg/dL 8.2* 8.1* 9.7     Results from last 7 days   Lab Units 07/10/25  2028   INR  1.21*   PTT seconds 35*     No results found for: \"HGBA1C\"  No results found for: \"CKTOTAL\", \"CKMB\", \"CKMBINDEX\", \"TROPONINI\"    Imaging Results Review: I reviewed radiology reports from this admission including: CT abdomen/pelvis and Echocardiogram.  Other Study Results Review: EKG was reviewed.       "

## 2025-07-12 NOTE — QUICK NOTE
Notified by RN patient /104 HR 110s-140s sustained, telemetry revealing AF versus NSR.  EKG revealing NSR.  10 mg IV labetalol administered for SBP > 180 mmHg however transient atrial fib/flutter remained on re-evaluation with HR 140s despite a.m. dose of 5 mg IV metoprolol, confirmed via EKG.  /92, patient denies chest pain or shortness of breath.  VSS.  Will order 10 mg IV Cardizem for HR greater than 120 bpm sustained greater than 2 minutes.  Consider cardiology consult.

## 2025-07-12 NOTE — PLAN OF CARE
Problem: INFECTION - ADULT  Goal: Absence or prevention of progression during hospitalization  Description: INTERVENTIONS:  - Assess and monitor for signs and symptoms of infection  - Monitor lab/diagnostic results  - Monitor all insertion sites, i.e. indwelling lines, tubes, and drains  - Monitor endotracheal if appropriate and nasal secretions for changes in amount and color  - Stanley appropriate cooling/warming therapies per order  - Administer medications as ordered  - Instruct and encourage patient and family to use good hand hygiene technique  - Identify and instruct in appropriate isolation precautions for identified infection/condition  Outcome: Progressing

## 2025-07-12 NOTE — ASSESSMENT & PLAN NOTE
Conservative management planned at this time  Continue with n.p.o. and bowel rest  On antibiotics  Management per SLIM and general surgery

## 2025-07-13 LAB
ALBUMIN SERPL BCG-MCNC: 3.5 G/DL (ref 3.5–5)
ALP SERPL-CCNC: 42 U/L (ref 34–104)
ALT SERPL W P-5'-P-CCNC: 8 U/L (ref 7–52)
ANION GAP SERPL CALCULATED.3IONS-SCNC: 10 MMOL/L (ref 4–13)
ANION GAP SERPL CALCULATED.3IONS-SCNC: 9 MMOL/L (ref 4–13)
AST SERPL W P-5'-P-CCNC: 12 U/L (ref 13–39)
ATRIAL RATE: 308 BPM
ATRIAL RATE: 84 BPM
BASOPHILS # BLD AUTO: 0.02 THOUSANDS/ÂΜL (ref 0–0.1)
BASOPHILS NFR BLD AUTO: 0 % (ref 0–1)
BILIRUB SERPL-MCNC: 0.46 MG/DL (ref 0.2–1)
BUN SERPL-MCNC: 8 MG/DL (ref 5–25)
BUN SERPL-MCNC: 9 MG/DL (ref 5–25)
CA-I BLD-SCNC: 0.98 MMOL/L (ref 1.12–1.32)
CALCIUM SERPL-MCNC: 7.8 MG/DL (ref 8.4–10.2)
CALCIUM SERPL-MCNC: 8.1 MG/DL (ref 8.4–10.2)
CHLORIDE SERPL-SCNC: 101 MMOL/L (ref 96–108)
CHLORIDE SERPL-SCNC: 102 MMOL/L (ref 96–108)
CO2 SERPL-SCNC: 23 MMOL/L (ref 21–32)
CO2 SERPL-SCNC: 24 MMOL/L (ref 21–32)
CREAT SERPL-MCNC: 0.66 MG/DL (ref 0.6–1.3)
CREAT SERPL-MCNC: 0.74 MG/DL (ref 0.6–1.3)
EOSINOPHIL # BLD AUTO: 0.15 THOUSAND/ÂΜL (ref 0–0.61)
EOSINOPHIL NFR BLD AUTO: 2 % (ref 0–6)
ERYTHROCYTE [DISTWIDTH] IN BLOOD BY AUTOMATED COUNT: 13.5 % (ref 11.6–15.1)
GFR SERPL CREATININE-BSD FRML MDRD: 101 ML/MIN/1.73SQ M
GFR SERPL CREATININE-BSD FRML MDRD: 92 ML/MIN/1.73SQ M
GLUCOSE SERPL-MCNC: 113 MG/DL (ref 65–140)
GLUCOSE SERPL-MCNC: 99 MG/DL (ref 65–140)
HCT VFR BLD AUTO: 34.9 % (ref 34.8–46.1)
HGB BLD-MCNC: 11.8 G/DL (ref 11.5–15.4)
IMM GRANULOCYTES # BLD AUTO: 0.04 THOUSAND/UL (ref 0–0.2)
IMM GRANULOCYTES NFR BLD AUTO: 1 % (ref 0–2)
LYMPHOCYTES # BLD AUTO: 0.76 THOUSANDS/ÂΜL (ref 0.6–4.47)
LYMPHOCYTES NFR BLD AUTO: 11 % (ref 14–44)
MAGNESIUM SERPL-MCNC: 2.2 MG/DL (ref 1.9–2.7)
MAGNESIUM SERPL-MCNC: 2.4 MG/DL (ref 1.9–2.7)
MCH RBC QN AUTO: 31.9 PG (ref 26.8–34.3)
MCHC RBC AUTO-ENTMCNC: 33.8 G/DL (ref 31.4–37.4)
MCV RBC AUTO: 94 FL (ref 82–98)
MONOCYTES # BLD AUTO: 0.47 THOUSAND/ÂΜL (ref 0.17–1.22)
MONOCYTES NFR BLD AUTO: 7 % (ref 4–12)
NEUTROPHILS # BLD AUTO: 5.63 THOUSANDS/ÂΜL (ref 1.85–7.62)
NEUTS SEG NFR BLD AUTO: 79 % (ref 43–75)
NRBC BLD AUTO-RTO: 0 /100 WBCS
P AXIS: 59 DEGREES
PHOSPHATE SERPL-MCNC: 1.9 MG/DL (ref 2.7–4.5)
PHOSPHATE SERPL-MCNC: 2.1 MG/DL (ref 2.7–4.5)
PLATELET # BLD AUTO: 225 THOUSANDS/UL (ref 149–390)
PMV BLD AUTO: 10.8 FL (ref 8.9–12.7)
POTASSIUM SERPL-SCNC: 3.6 MMOL/L (ref 3.5–5.3)
POTASSIUM SERPL-SCNC: 3.9 MMOL/L (ref 3.5–5.3)
PR INTERVAL: 132 MS
PROT SERPL-MCNC: 6.9 G/DL (ref 6.4–8.4)
QRS AXIS: 18 DEGREES
QRS AXIS: 20 DEGREES
QRSD INTERVAL: 70 MS
QRSD INTERVAL: 80 MS
QT INTERVAL: 368 MS
QT INTERVAL: 380 MS
QTC INTERVAL: 450 MS
QTC INTERVAL: 491 MS
RBC # BLD AUTO: 3.7 MILLION/UL (ref 3.81–5.12)
SODIUM SERPL-SCNC: 134 MMOL/L (ref 135–147)
SODIUM SERPL-SCNC: 135 MMOL/L (ref 135–147)
T WAVE AXIS: 48 DEGREES
T WAVE AXIS: 58 DEGREES
VENTRICULAR RATE: 107 BPM
VENTRICULAR RATE: 84 BPM
WBC # BLD AUTO: 7.07 THOUSAND/UL (ref 4.31–10.16)

## 2025-07-13 PROCEDURE — 84100 ASSAY OF PHOSPHORUS: CPT | Performed by: PHYSICIAN ASSISTANT

## 2025-07-13 PROCEDURE — 83735 ASSAY OF MAGNESIUM: CPT | Performed by: PHYSICIAN ASSISTANT

## 2025-07-13 PROCEDURE — 80053 COMPREHEN METABOLIC PANEL: CPT | Performed by: PHYSICIAN ASSISTANT

## 2025-07-13 PROCEDURE — 84100 ASSAY OF PHOSPHORUS: CPT

## 2025-07-13 PROCEDURE — 80048 BASIC METABOLIC PNL TOTAL CA: CPT

## 2025-07-13 PROCEDURE — 99232 SBSQ HOSP IP/OBS MODERATE 35: CPT

## 2025-07-13 PROCEDURE — 93010 ELECTROCARDIOGRAM REPORT: CPT | Performed by: INTERNAL MEDICINE

## 2025-07-13 PROCEDURE — 85025 COMPLETE CBC W/AUTO DIFF WBC: CPT | Performed by: PHYSICIAN ASSISTANT

## 2025-07-13 PROCEDURE — 99232 SBSQ HOSP IP/OBS MODERATE 35: CPT | Performed by: SURGERY

## 2025-07-13 PROCEDURE — 93005 ELECTROCARDIOGRAM TRACING: CPT

## 2025-07-13 PROCEDURE — 82330 ASSAY OF CALCIUM: CPT | Performed by: PHYSICIAN ASSISTANT

## 2025-07-13 PROCEDURE — 83735 ASSAY OF MAGNESIUM: CPT

## 2025-07-13 RX ORDER — METOPROLOL TARTRATE 1 MG/ML
2.5 INJECTION, SOLUTION INTRAVENOUS ONCE
Status: COMPLETED | OUTPATIENT
Start: 2025-07-13 | End: 2025-07-13

## 2025-07-13 RX ADMIN — ACETAMINOPHEN 1000 MG: 10 INJECTION INTRAVENOUS at 10:32

## 2025-07-13 RX ADMIN — PANTOPRAZOLE SODIUM 40 MG: 40 INJECTION, POWDER, FOR SOLUTION INTRAVENOUS at 08:37

## 2025-07-13 RX ADMIN — METOROPROLOL TARTRATE 5 MG: 5 INJECTION, SOLUTION INTRAVENOUS at 06:18

## 2025-07-13 RX ADMIN — HEPARIN SODIUM 7500 UNITS: 5000 INJECTION INTRAVENOUS; SUBCUTANEOUS at 05:27

## 2025-07-13 RX ADMIN — PANTOPRAZOLE SODIUM 40 MG: 40 INJECTION, POWDER, FOR SOLUTION INTRAVENOUS at 22:17

## 2025-07-13 RX ADMIN — METRONIDAZOLE 500 MG: 500 INJECTION, SOLUTION INTRAVENOUS at 05:27

## 2025-07-13 RX ADMIN — MORPHINE SULFATE 2 MG: 2 INJECTION, SOLUTION INTRAMUSCULAR; INTRAVENOUS at 03:32

## 2025-07-13 RX ADMIN — CHLORHEXIDINE GLUCONATE 15 ML: 1.2 SOLUTION ORAL at 08:37

## 2025-07-13 RX ADMIN — METOROPROLOL TARTRATE 5 MG: 5 INJECTION, SOLUTION INTRAVENOUS at 22:17

## 2025-07-13 RX ADMIN — HEPARIN SODIUM 7500 UNITS: 5000 INJECTION INTRAVENOUS; SUBCUTANEOUS at 22:17

## 2025-07-13 RX ADMIN — METOPROLOL TARTRATE 2.5 MG: 1 INJECTION, SOLUTION INTRAVENOUS at 02:55

## 2025-07-13 RX ADMIN — METOROPROLOL TARTRATE 5 MG: 5 INJECTION, SOLUTION INTRAVENOUS at 10:32

## 2025-07-13 RX ADMIN — HEPARIN SODIUM 7500 UNITS: 5000 INJECTION INTRAVENOUS; SUBCUTANEOUS at 14:00

## 2025-07-13 RX ADMIN — SODIUM CHLORIDE, SODIUM GLUCONATE, SODIUM ACETATE, POTASSIUM CHLORIDE, MAGNESIUM CHLORIDE, SODIUM PHOSPHATE, DIBASIC, AND POTASSIUM PHOSPHATE 125 ML/HR: .53; .5; .37; .037; .03; .012; .00082 INJECTION, SOLUTION INTRAVENOUS at 05:27

## 2025-07-13 RX ADMIN — CEFEPIME 2000 MG: 2 INJECTION, POWDER, FOR SOLUTION INTRAVENOUS at 23:16

## 2025-07-13 RX ADMIN — DILTIAZEM HYDROCHLORIDE 10 MG: 5 INJECTION INTRAVENOUS at 00:14

## 2025-07-13 RX ADMIN — CEFEPIME 2000 MG: 2 INJECTION, POWDER, FOR SOLUTION INTRAVENOUS at 08:00

## 2025-07-13 RX ADMIN — METRONIDAZOLE 500 MG: 500 INJECTION, SOLUTION INTRAVENOUS at 17:38

## 2025-07-13 RX ADMIN — CEFEPIME 2000 MG: 2 INJECTION, POWDER, FOR SOLUTION INTRAVENOUS at 15:00

## 2025-07-13 RX ADMIN — CHLORHEXIDINE GLUCONATE 15 ML: 1.2 SOLUTION ORAL at 22:17

## 2025-07-13 RX ADMIN — METOROPROLOL TARTRATE 5 MG: 5 INJECTION, SOLUTION INTRAVENOUS at 16:37

## 2025-07-13 NOTE — ASSESSMENT & PLAN NOTE
Pt admitted to ICU after development of rapid a fib requiring Cardizem and close monitoring   No bowel function. WBC 11.22 from 12.4  on admission  Procalcitonin 1.31, 1.06   Phosphorus 2.2  Magnesium 1.8   Pt is Shinto and refuses blood products and albumin.   H/o multiple intracerebral aneurysms, on Brillinta.   VSS with the exception of respiratory rate 20-20. Pt with GONZALO and did not have CPAP on last night, she is not tachypneic, she is sitting comfortably in the chair, O2 sat 95%     Pt is passing gas and had 3 bowel movements, waves of gas pain that resolve quickly     Advance to clear liquids and as tolerated to low fiber diet for dinner,   Ok to transfer to [facility] for management of a fib, surgery on consult   IV resuscitation, follow endpoints  Replete electrolytes as needed   ABX :cefepime and flagyl   Hold Brillinta    High risk of complications due to antiplatelet medications, Congregational status, BMI 50  Pt will have her nephew bring in her CPAP from home

## 2025-07-13 NOTE — ASSESSMENT & PLAN NOTE
Patient initially presenting with lower abdominal pain, nausea and vomiting + fevers  CT abdomen pelvis: Perforated sigmoid diverticulitis.  Adjacent extraluminal free air and scattered small volume pneumoperitoneum.  2 cm adjacent loculated fluid collection anteriorly.  Reactive enterocolitis  Was admitted to the general surgery service, however patient admitted to ICU after development of rapid A-fib requiring Cardizem and close monitoring  Surgery opting for conservative management  Diet advanced to clear liquids as tolerated to low fiber diet for dinner tonight  Continue cefepime and Flagyl D4

## 2025-07-13 NOTE — ASSESSMENT & PLAN NOTE
History of PAF during brain aneurysm coiling and stenting  Not previously on anticoagulation   Now appears to have new onset atrial flutter paroxysmally  Cardiology consulted, appreciate recs  Hold Brilinta pending outcome of abdominal issues  Ideally should be on Eliquis  Should follow-up with primary cardiologist in [place] and be seen by EP for possible atrial flutter ablation and Watchman device

## 2025-07-13 NOTE — PROGRESS NOTES
Progress Note - Hospitalist   Name: Cammy Prieto 55 y.o. female I MRN: [id number]  Unit/Bed#: -01 I Date of Admission: 7/10/2025   Date of Service: 7/13/2025 I Hospital Day: 3    Assessment & Plan  Perforation of sigmoid colon due to diverticulitis  Patient initially presenting with lower abdominal pain, nausea and vomiting + fevers  CT abdomen pelvis: Perforated sigmoid diverticulitis.  Adjacent extraluminal free air and scattered small volume pneumoperitoneum.  2 cm adjacent loculated fluid collection anteriorly.  Reactive enterocolitis  Was admitted to the general surgery service, however patient admitted to ICU after development of rapid A-fib requiring Cardizem and close monitoring  Surgery opting for conservative management  Diet advanced to clear liquids as tolerated to low fiber diet for dinner tonight  Continue cefepime and Flagyl D4  Essential hypertension  Quite elevated on admission 210/91  Now stable 122/70  Continue with metoprolol 5 mg IV every 6  Plan to resume home antihypertensives when able to take oral (patient was previously taking Exforge 5/320 as well as Ziac 5/6.25  Paroxysmal atrial fibrillation (HCC)  History of PAF during brain aneurysm coiling and stenting  Not previously on anticoagulation   Now appears to have new onset atrial flutter paroxysmally  Cardiology consulted, appreciate recs  Hold Brilinta pending outcome of abdominal issues  Ideally should be on Eliquis  Should follow-up with primary cardiologist in [place] and be seen by EP for possible atrial flutter ablation and Watchman device  MORBID (SEVERE) OBESITY DUE TO EXCESS CALORIES  Encourage weight loss  Sepsis without acute organ dysfunction (HCC)  Secondary to acute perforation  Antibiotics day 4  Leukocytosis resolved  Afebrile  Blood cultures no growth at 48 hours  History of brain aneurysm-secured  Status post ALEXUS stent and coiling  Was previously on Brilinta, currently on hold  Refusal of blood product  Patient  is a Gnosticist    VTE Pharmacologic Prophylaxis:   Moderate Risk (Score 3-4) - Pharmacological DVT Prophylaxis Ordered: heparin.    Mobility:   Basic Mobility Inpatient Raw Score: 18  JH-HLM Goal: 6: Walk 10 steps or more  JH-HLM Achieved: 7: Walk 25 feet or more  JH-HLM Goal achieved. Continue to encourage appropriate mobility.    Patient Centered Rounds: I performed bedside rounds with nursing staff today.   Discussions with Specialists or Other Care Team Provider: None    Education and Discussions with Family / Patient: Patient declined call to .     Current Length of Stay: 3 day(s)  Current Patient Status: Inpatient   Certification Statement: The patient will continue to require additional inpatient hospital stay due to continued IV abx, advance diet as able, reeval by cardiology  Discharge Plan: Anticipate discharge in 24-48 hrs to discharge location to be determined pending rehab evaluations.    Code Status: Level 1 - Full Code    Subjective   Seen and examined.  No acute events overnight.  Patient reports feeling well.  She denies any chest pain, shortness of breath, nausea, vomiting, diarrhea.  No additional complaints at this time    Objective :  Temp:  [97.9 °F (36.6 °C)-98.5 °F (36.9 °C)] 98.5 °F (36.9 °C)  HR:  [] 142  BP: (108-176)/() 170/108  Resp:  [15-20] 20  SpO2:  [90 %-96 %] 93 %  O2 Device: None (Room air)    Body mass index is 51.73 kg/m².     Input and Output Summary (last 24 hours):     Intake/Output Summary (Last 24 hours) at 7/13/2025 0844  Last data filed at 7/13/2025 0336  Gross per 24 hour   Intake --   Output 3025 ml   Net -3025 ml       Physical Exam  Constitutional:       General: She is not in acute distress.     Appearance: She is obese. She is not toxic-appearing.   HENT:      Head: Normocephalic and atraumatic.      Nose: Nose normal.      Mouth/Throat:      Mouth: Mucous membranes are moist.     Eyes:      Conjunctiva/sclera: Conjunctivae normal.        Cardiovascular:      Rate and Rhythm: Tachycardia present.      Pulses: Normal pulses.   Pulmonary:      Breath sounds: Normal breath sounds. No wheezing, rhonchi or rales.   Abdominal:      General: There is distension.      Palpations: Abdomen is soft.      Tenderness: There is abdominal tenderness.     Musculoskeletal:      Cervical back: Normal range of motion.      Right lower leg: Edema present.      Left lower leg: Edema present.     Skin:     General: Skin is warm and dry.     Neurological:      General: No focal deficit present.     Psychiatric:         Mood and Affect: Mood normal.         Lines/Drains:  Lines/Drains/Airways       Active Status       Name Placement date Placement time Site Days    Urethral Catheter Latex 16 Fr. 07/10/25  2057  Latex  2                  Urinary Catheter:  Goal for removal: No longer needed. Will place order to discontinue           Telemetry:  Telemetry Orders (From admission, onward)               24 Hour Telemetry Monitoring  Continuous x 24 Hours (Telem)        Expiring   Question:  Reason for 24 Hour Telemetry  Answer:  Arrhythmias requiring acute medical intervention / PPM or ICD malfunction                     Telemetry Reviewed: Normal Sinus Rhythm  Indication for Continued Telemetry Use: Arrthymias requiring medical therapy               Lab Results: I have reviewed the following results:   Results from last 7 days   Lab Units 07/12/25  0507   WBC Thousand/uL 10.70*   HEMOGLOBIN g/dL 11.2*   HEMATOCRIT % 33.4*   PLATELETS Thousands/uL 178   SEGS PCT % 83*   LYMPHO PCT % 9*   MONO PCT % 6   EOS PCT % 1     Results from last 7 days   Lab Units 07/13/25  0018 07/12/25  0507   SODIUM mmol/L 135 137   POTASSIUM mmol/L 3.6 3.6   CHLORIDE mmol/L 102 105   CO2 mmol/L 24 23   BUN mg/dL 9 9   CREATININE mg/dL 0.74 0.79   ANION GAP mmol/L 9 9   CALCIUM mg/dL 8.1* 8.2*   ALBUMIN g/dL  --  3.3*   TOTAL BILIRUBIN mg/dL  --  0.55   ALK PHOS U/L  --  40   ALT U/L  --  7   AST  U/L  --  10*   GLUCOSE RANDOM mg/dL 113 115     Results from last 7 days   Lab Units 07/10/25  2028   INR  1.21*             Results from last 7 days   Lab Units 07/11/25  0521 07/10/25  2028   LACTIC ACID mmol/L  --  0.9   PROCALCITONIN ng/ml 1.31* 1.06*       Recent Cultures (last 7 days):   Results from last 7 days   Lab Units 07/10/25  2113   BLOOD CULTURE  No Growth at 48 hrs.  No Growth at 48 hrs.       Imaging Results Review: I reviewed radiology reports from this admission including: CT abdomen/pelvis.  Other Study Results Review: EKG was reviewed.     Last 24 Hours Medication List:     Current Facility-Administered Medications:     acetaminophen (Ofirmev) injection 1,000 mg, Q6H PRN, Last Rate: 1,000 mg (07/12/25 2045)    cefepime (MAXIPIME) 2 g/50 mL dextrose IVPB, Q8H, Last Rate: 2,000 mg (07/12/25 2227)    chlorhexidine (PERIDEX) 0.12 % oral rinse 15 mL, Q12H CELE    diltiazem (CARDIZEM) injection 10 mg, Once    heparin (porcine) subcutaneous injection 7,500 Units, Q8H CELE    labetalol (NORMODYNE) injection 10 mg, Q4H PRN    metoprolol (LOPRESSOR) injection 5 mg, Q6H    metroNIDAZOLE (FLAGYL) IVPB (premix) 500 mg 100 mL, Q12H, Last Rate: 500 mg (07/13/25 0527)    morphine injection 2 mg, Q3H PRN    multi-electrolyte (Plasmalyte-A/Isolyte-S PH 7.4/Normosol-R) IV solution, Continuous, Last Rate: 125 mL/hr (07/13/25 0527)    ondansetron (ZOFRAN) injection 4 mg, Q6H PRN    pantoprazole (PROTONIX) injection 40 mg, Q12H CELE    [Held by provider] ticagrelor (BRILINTA) tablet 90 mg, BID    Administrative Statements   Today, Patient Was Seen By: Alyse Siddiqi PA-C    **Please Note: This note may have been constructed using a voice recognition system.**

## 2025-07-13 NOTE — PROGRESS NOTES
Progress Note - Surgery-General   Name: Cammy Prieto 55 y.o. female I MRN: [id number]  Unit/Bed#: -01 I Date of Admission: 7/10/2025   Date of Service: 7/13/2025 I Hospital Day: 3    Assessment & Plan  Perforation of sigmoid colon due to diverticulitis    Pt admitted to ICU after development of rapid a fib requiring Cardizem and close monitoring   No bowel function. WBC 11.22 from 12.4  on admission  Procalcitonin 1.31, 1.06   Phosphorus 2.2  Magnesium 1.8   Pt is [employer] and refuses blood products and albumin.   H/o multiple intracerebral aneurysms, on Brillinta.   VSS with the exception of respiratory rate 20-20. Pt with GONZALO and did not have CPAP on last night, she is not tachypneic, she is sitting comfortably in the chair, O2 sat 95%     Pt is passing gas and had 3 bowel movements, waves of gas pain that resolve quickly     Advance to clear liquids and as tolerated to low fiber diet for dinner,   Ok to transfer to [facility] for management of a fib, surgery on consult   IV resuscitation, follow endpoints  Replete electrolytes as needed   ABX :cefepime and flagyl   Hold Brillinta    High risk of complications due to antiplatelet medications, [employer] status, BMI 50  Pt will have her nephew bring in her CPAP from home    Essential hypertension  Per [facility]   Paroxysmal atrial fibrillation (HCC)  Per [facility]   MORBID (SEVERE) OBESITY DUE TO EXCESS CALORIES  Per [facility]   Sepsis without acute organ dysfunction (HCC)  Per [facility]   History of brain aneurysm-secured  Per [facility]   Refusal of blood product  Per [facility]         Subjective    I am passing gas  I had 3 bowel movements, I get waves of gas pain but they go away quickly     Objective :  Temp:  [98.1 °F (36.7 °C)-98.5 °F (36.9 °C)] 98.2 °F (36.8 °C)  HR:  [] 85  BP: (108-175)/() 123/96  Resp:  [15-20] 20  SpO2:  [90 %-96 %] 92 %    I/O         07/11 0701 07/12 0700 07/12 0701  07/13 0700 07/13 0701 07/14 0700    P.O.       I.V. (mL/kg) 186.2  (1.3)      IV Piggyback 700      Total Intake(mL/kg) 886.2 (6.3)      Urine (mL/kg/hr) 675 (0.2) 3425 (1) 525 (0.6)    Stool  0     Total Output 675 3425 525    Net +211.2 -3425 -525           Unmeasured Stool Occurrence  1 x           Lines/Drains/Airways       Active Status       Name Placement date Placement time Site Days    Urethral Catheter Latex 16 Fr. 07/10/25  2057  Latex  2                  Physical Exam  Constitutional:       Appearance: She is obese.     Cardiovascular:      Rate and Rhythm: Normal rate and regular rhythm.   Pulmonary:      Effort: Pulmonary effort is normal.      Breath sounds: Normal breath sounds.   Abdominal:      General: There is no distension.      Palpations: Abdomen is soft.      Comments: Mildly tender in LLQ and epigastric area   NBS     Musculoskeletal:      Comments: No calf tenderness      Skin:     Coloration: Skin is not jaundiced.     Neurological:      General: No focal deficit present.      Mental Status: She is alert and oriented to person, place, and time.     Psychiatric:         Mood and Affect: Mood normal.         Behavior: Behavior normal.           Lab Results: I have reviewed the following results:  Recent Labs     07/10/25  2028 07/11/25  0521 07/13/25  1201   WBC  --    < > 7.07   HGB  --    < > 11.8   HCT  --    < > 34.9   PLT  --    < > 225   SODIUM  --    < > 134*   K  --    < > 3.9   CL  --    < > 101   CO2  --    < > 23   BUN  --    < > 8   CREATININE  --    < > 0.66   GLUC  --    < > 99   CAIONIZED  --    < > 0.98*   MG  --    < > 2.4   PHOS  --    < > 1.9*   AST  --    < > 12*   ALT  --    < > 8   ALB  --    < > 3.5   TBILI  --    < > 0.46   ALKPHOS  --    < > 42   PTT 35*  --   --    INR 1.21*  --   --    LACTICACID 0.9  --   --     < > = values in this interval not displayed.       Ese HUNTLEY

## 2025-07-13 NOTE — ASSESSMENT & PLAN NOTE
Secondary to acute perforation  Antibiotics day 4  Leukocytosis resolved  Afebrile  Blood cultures no growth at 48 hours

## 2025-07-14 ENCOUNTER — APPOINTMENT (INPATIENT)
Dept: RADIOLOGY | Facility: HOSPITAL | Age: 55
DRG: 872 | End: 2025-07-14
Payer: COMMERCIAL

## 2025-07-14 PROBLEM — I45.5 SINUS PAUSE: Status: ACTIVE | Noted: 2025-07-14

## 2025-07-14 LAB
ANION GAP SERPL CALCULATED.3IONS-SCNC: 6 MMOL/L (ref 4–13)
ATRIAL RATE: 80 BPM
BUN SERPL-MCNC: 11 MG/DL (ref 5–25)
CALCIUM SERPL-MCNC: 8.5 MG/DL (ref 8.4–10.2)
CARDIAC TROPONIN I PNL SERPL HS: 9 NG/L (ref ?–50)
CHLORIDE SERPL-SCNC: 101 MMOL/L (ref 96–108)
CO2 SERPL-SCNC: 29 MMOL/L (ref 21–32)
CREAT SERPL-MCNC: 0.81 MG/DL (ref 0.6–1.3)
ERYTHROCYTE [DISTWIDTH] IN BLOOD BY AUTOMATED COUNT: 13.7 % (ref 11.6–15.1)
GFR SERPL CREATININE-BSD FRML MDRD: 83 ML/MIN/1.73SQ M
GLUCOSE SERPL-MCNC: 116 MG/DL (ref 65–140)
HCT VFR BLD AUTO: 36.3 % (ref 34.8–46.1)
HGB BLD-MCNC: 11.6 G/DL (ref 11.5–15.4)
MAGNESIUM SERPL-MCNC: 2.1 MG/DL (ref 1.9–2.7)
MCH RBC QN AUTO: 31 PG (ref 26.8–34.3)
MCHC RBC AUTO-ENTMCNC: 32 G/DL (ref 31.4–37.4)
MCV RBC AUTO: 97 FL (ref 82–98)
P AXIS: 54 DEGREES
PLATELET # BLD AUTO: 217 THOUSANDS/UL (ref 149–390)
PMV BLD AUTO: 10.2 FL (ref 8.9–12.7)
POTASSIUM SERPL-SCNC: 3.3 MMOL/L (ref 3.5–5.3)
PR INTERVAL: 122 MS
QRS AXIS: 14 DEGREES
QRSD INTERVAL: 74 MS
QT INTERVAL: 394 MS
QTC INTERVAL: 455 MS
RBC # BLD AUTO: 3.74 MILLION/UL (ref 3.81–5.12)
SODIUM SERPL-SCNC: 136 MMOL/L (ref 135–147)
T WAVE AXIS: 48 DEGREES
VENTRICULAR RATE: 80 BPM
WBC # BLD AUTO: 8.29 THOUSAND/UL (ref 4.31–10.16)

## 2025-07-14 PROCEDURE — 80048 BASIC METABOLIC PNL TOTAL CA: CPT

## 2025-07-14 PROCEDURE — 83735 ASSAY OF MAGNESIUM: CPT

## 2025-07-14 PROCEDURE — 93010 ELECTROCARDIOGRAM REPORT: CPT | Performed by: INTERNAL MEDICINE

## 2025-07-14 PROCEDURE — 99232 SBSQ HOSP IP/OBS MODERATE 35: CPT

## 2025-07-14 PROCEDURE — 84484 ASSAY OF TROPONIN QUANT: CPT

## 2025-07-14 PROCEDURE — 71045 X-RAY EXAM CHEST 1 VIEW: CPT

## 2025-07-14 PROCEDURE — 93005 ELECTROCARDIOGRAM TRACING: CPT

## 2025-07-14 PROCEDURE — 99232 SBSQ HOSP IP/OBS MODERATE 35: CPT | Performed by: INTERNAL MEDICINE

## 2025-07-14 PROCEDURE — 99232 SBSQ HOSP IP/OBS MODERATE 35: CPT | Performed by: SURGERY

## 2025-07-14 PROCEDURE — 85027 COMPLETE CBC AUTOMATED: CPT

## 2025-07-14 RX ORDER — AMLODIPINE BESYLATE 5 MG/1
5 TABLET ORAL DAILY
Status: DISCONTINUED | OUTPATIENT
Start: 2025-07-14 | End: 2025-07-15 | Stop reason: HOSPADM

## 2025-07-14 RX ORDER — LOSARTAN POTASSIUM 50 MG/1
100 TABLET ORAL DAILY
Status: DISCONTINUED | OUTPATIENT
Start: 2025-07-14 | End: 2025-07-15 | Stop reason: HOSPADM

## 2025-07-14 RX ORDER — POTASSIUM CHLORIDE 1500 MG/1
40 TABLET, EXTENDED RELEASE ORAL ONCE
Status: COMPLETED | OUTPATIENT
Start: 2025-07-14 | End: 2025-07-14

## 2025-07-14 RX ORDER — FUROSEMIDE 10 MG/ML
20 INJECTION INTRAMUSCULAR; INTRAVENOUS ONCE
Status: COMPLETED | OUTPATIENT
Start: 2025-07-14 | End: 2025-07-14

## 2025-07-14 RX ORDER — POTASSIUM CHLORIDE 29.8 MG/ML
40 INJECTION INTRAVENOUS ONCE
Status: DISCONTINUED | OUTPATIENT
Start: 2025-07-14 | End: 2025-07-14

## 2025-07-14 RX ORDER — METOPROLOL TARTRATE 1 MG/ML
5 INJECTION, SOLUTION INTRAVENOUS ONCE
Status: COMPLETED | OUTPATIENT
Start: 2025-07-14 | End: 2025-07-14

## 2025-07-14 RX ORDER — POTASSIUM CHLORIDE 14.9 MG/ML
20 INJECTION INTRAVENOUS
Status: DISCONTINUED | OUTPATIENT
Start: 2025-07-14 | End: 2025-07-14

## 2025-07-14 RX ADMIN — MORPHINE SULFATE 2 MG: 2 INJECTION, SOLUTION INTRAMUSCULAR; INTRAVENOUS at 00:53

## 2025-07-14 RX ADMIN — HEPARIN SODIUM 7500 UNITS: 5000 INJECTION INTRAVENOUS; SUBCUTANEOUS at 21:48

## 2025-07-14 RX ADMIN — HEPARIN SODIUM 7500 UNITS: 5000 INJECTION INTRAVENOUS; SUBCUTANEOUS at 05:09

## 2025-07-14 RX ADMIN — METRONIDAZOLE 500 MG: 500 INJECTION, SOLUTION INTRAVENOUS at 05:09

## 2025-07-14 RX ADMIN — CEFEPIME 2000 MG: 2 INJECTION, POWDER, FOR SOLUTION INTRAVENOUS at 15:40

## 2025-07-14 RX ADMIN — METOROPROLOL TARTRATE 5 MG: 5 INJECTION, SOLUTION INTRAVENOUS at 05:09

## 2025-07-14 RX ADMIN — CEFEPIME 2000 MG: 2 INJECTION, POWDER, FOR SOLUTION INTRAVENOUS at 08:00

## 2025-07-14 RX ADMIN — MORPHINE SULFATE 2 MG: 2 INJECTION, SOLUTION INTRAMUSCULAR; INTRAVENOUS at 21:48

## 2025-07-14 RX ADMIN — POTASSIUM CHLORIDE 40 MEQ: 1500 TABLET, EXTENDED RELEASE ORAL at 21:48

## 2025-07-14 RX ADMIN — PANTOPRAZOLE SODIUM 40 MG: 40 INJECTION, POWDER, FOR SOLUTION INTRAVENOUS at 21:48

## 2025-07-14 RX ADMIN — METOPROLOL TARTRATE 5 MG: 5 INJECTION INTRAVENOUS at 18:13

## 2025-07-14 RX ADMIN — METRONIDAZOLE 500 MG: 500 INJECTION, SOLUTION INTRAVENOUS at 18:01

## 2025-07-14 RX ADMIN — HEPARIN SODIUM 7500 UNITS: 5000 INJECTION INTRAVENOUS; SUBCUTANEOUS at 13:35

## 2025-07-14 RX ADMIN — PANTOPRAZOLE SODIUM 40 MG: 40 INJECTION, POWDER, FOR SOLUTION INTRAVENOUS at 08:03

## 2025-07-14 RX ADMIN — ACETAMINOPHEN 1000 MG: 10 INJECTION INTRAVENOUS at 05:09

## 2025-07-14 RX ADMIN — LOSARTAN POTASSIUM 100 MG: 50 TABLET, FILM COATED ORAL at 14:19

## 2025-07-14 RX ADMIN — AMLODIPINE BESYLATE 5 MG: 5 TABLET ORAL at 14:19

## 2025-07-14 RX ADMIN — FUROSEMIDE 20 MG: 10 INJECTION, SOLUTION INTRAMUSCULAR; INTRAVENOUS at 21:47

## 2025-07-14 RX ADMIN — CEFEPIME 2000 MG: 2 INJECTION, POWDER, FOR SOLUTION INTRAVENOUS at 21:47

## 2025-07-14 NOTE — ASSESSMENT & PLAN NOTE
Episodes of bradycardia and sinus pauses up to 5 seconds in the setting of untreated sleep apnea, IV Lopressor  Patient currently appears asymptomatic  Continue to monitor telemetry  Discontinue IV Lopressor  Reached out to EP at [place] if patient is asymptomatic no need for PPM at this time  Advised about these new findings and how important will be to follow-up with the EP given new diagnosis of atrial flutter/bradycardia and about the potential need for a flutter ablation

## 2025-07-14 NOTE — PROGRESS NOTES
Progress Note - Cardiology   Name: Cammy Prieto 55 y.o. female I MRN: [id number]  Unit/Bed#: -01 I Date of Admission: 7/10/2025   Date of Service: 7/14/2025 I Hospital Day: 4    Assessment & Plan  Paroxysmal atrial fibrillation (HCC)  History of PAF during brain aneurysm coiling and stenting  Was not previously taking anticoagulation  Now appears to have new onset atrial flutter paroxysmally  Currently patient is in sinus with heart rate in the 70s to 80s  Patient asymptomatic  Discussed pathophysiology of atrial flutter with the patient; all questions answered  Patient will need oral rate control when able to take oral (could plan to resume her bisoprolol/HCTZ)  Patient ideally would need oral anticoagulation, [id number]=5 (female, htn) BUT would hold off at this time given current diagnosis of perforated diverticulitis, also with hx of SAH  Advised to follow-up with primary cardiologist in [facility]  Also advised she would also need to be evaluated by electrophysiology to discuss treatment options of a flutter including ablation therapy and/or Watchman   Patient had recent echocardiogram done in [place] 6/20/25: EF 63%, trivial MR, mild TR, trivial [place] (no need to repeat at this time)  TSH 4.06  Sinus pause  Episodes of bradycardia and sinus pauses up to 5 seconds in the setting of untreated sleep apnea, IV Lopressor  Patient currently appears asymptomatic  Continue to monitor telemetry  Discontinue IV Lopressor  Reached out to EP at [place] if patient is asymptomatic no need for PPM at this time  Advised about these new findings and how important will be to follow-up with the EP given new diagnosis of atrial flutter/bradycardia and about the potential need for a flutter ablation  Perforation of sigmoid colon due to diverticulitis  Conservative management planned at this time  Continue with n.p.o. and bowel rest  On antibiotics  Management per [facility] and general surgery  Essential hypertension  Quite  elevated on admission 210/91  Now stable 136/75  Plan to resume home antihypertensives when able to take oral (patient was previously taking Exforge 5/320 as well as Ziac 5/6.25  MORBID (SEVERE) OBESITY DUE TO EXCESS CALORIES  Weight loss advised  Sepsis without acute organ dysfunction (HCC)  On antibiotics  Management per [facility] and general surgery  History of brain aneurysm-secured  Status post ALEXUS stent and coiling  Was previously on Brilinta, currently on hold  Refusal of blood product  History of Religion    Subjective   Chief Complaint: Patient sitting in the chair feeling well, well-appearing.  Patient has healthcare POA at the bedside.  Patient has trouble with short-term memory.  Patient with bradycardia and sinus pauses noted overnight per nursing/[facility].  Telemetry reviewed.  Upon questioning patient denies any symptoms of dizziness, lightheadedness, near-syncope, syncope.    Objective :  Temp:  [98 °F (36.7 °C)-99.6 °F (37.6 °C)] 98 °F (36.7 °C)  HR:  [77-95] 83  BP: (136-176)/() 176/103  Resp:  [17-20] 17  SpO2:  [92 %-95 %] 94 %  O2 Device: None (Room air)  Orthostatic Blood Pressures      Flowsheet Row Most Recent Value   Blood Pressure 176/103 filed at 07/14/2025 1422   Patient Position - Orthostatic VS Sitting filed at 07/13/2025 1830          First Weight: Weight - Scale: (!) 140 kg (307 lb 8.7 oz) (07/10/25 1503)  Vitals:    07/13/25 0552 07/14/25 0600   Weight: (!) 141 kg (310 lb 13.6 oz) (!) 140 kg (308 lb 10.3 oz)     Physical Exam  Vitals and nursing note reviewed. Exam conducted with a chaperone present.   Constitutional:       Appearance: Normal appearance.   HENT:      Head: Normocephalic and atraumatic.     Cardiovascular:      Rate and Rhythm: Normal rate and regular rhythm.      Pulses: Normal pulses.      Heart sounds: Normal heart sounds.   Pulmonary:      Effort: Pulmonary effort is normal.      Breath sounds: Normal breath sounds.     Musculoskeletal:         General:  "Normal range of motion.      Cervical back: Normal range of motion and neck supple.     Skin:     General: Skin is warm and dry.     Neurological:      General: No focal deficit present.      Mental Status: She is alert and oriented to person, place, and time.     Psychiatric:         Mood and Affect: Mood normal.         Behavior: Behavior normal.         Thought Content: Thought content normal.         Judgment: Judgment normal.           Lab Results: I have reviewed the following results:  Results from last 7 days   Lab Units 07/14/25  1454 07/13/25  1201 07/12/25  0507   WBC Thousand/uL 8.29 7.07 10.70*   HEMOGLOBIN g/dL 11.6 11.8 11.2*   HEMATOCRIT % 36.3 34.9 33.4*   PLATELETS Thousands/uL 217 225 178     Results from last 7 days   Lab Units 07/14/25  1454 07/13/25  1201 07/13/25  0018   POTASSIUM mmol/L 3.3* 3.9 3.6   CHLORIDE mmol/L 101 101 102   CO2 mmol/L 29 23 24   BUN mg/dL 11 8 9   CREATININE mg/dL 0.81 0.66 0.74   CALCIUM mg/dL 8.5 7.8* 8.1*     Results from last 7 days   Lab Units 07/10/25  2028   INR  1.21*   PTT seconds 35*     No results found for: \"HGBA1C\"  No results found for: \"CKTOTAL\", \"CKMB\", \"CKMBINDEX\", \"TROPONINI\"      "

## 2025-07-14 NOTE — ASSESSMENT & PLAN NOTE
History of PAF during brain aneurysm coiling and stenting  Not previously on anticoagulation   Now appears to have new onset atrial flutter paroxysmally   Overnight 7/13-7/14 with notable sinus pause & episodes of A flutter with RVR. 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 Ideally should be on Eliquis. Plan to follow-up with primary cardiologist in [place] and EP follow-up   Continue to monitor on telemetry

## 2025-07-14 NOTE — ASSESSMENT & PLAN NOTE
Secondary to acute perforation  Antibiotics day 5  Leukocytosis resolved  Afebrile  Blood cultures no growth at 72 hours

## 2025-07-14 NOTE — ASSESSMENT & PLAN NOTE
Patient initially presenting with lower abdominal pain, nausea and vomiting + fevers  CT abdomen pelvis: Perforated sigmoid diverticulitis.  Adjacent extraluminal free air and scattered small volume pneumoperitoneum.  2 cm adjacent loculated fluid collection anteriorly.  Reactive enterocolitis  Was admitted to the general surgery service, however patient admitted to ICU after development of rapid A-fib requiring Cardizem and close monitoring  Surgery opting for conservative management  Diet now advanced to low fiber diet  Continue cefepime and Flagyl D5

## 2025-07-14 NOTE — QUICK NOTE
Patient complaining of shortness of breath with exertion. Noted to be in A flutter with RVR. Patient seen and examined. Overall well appearing in no acute distress. VS obtained and significant for hypertension & tachycardia.     Check EKG, hs trop, CXR  Give IV lopressor 5mg x 1   Continue telemetry monitoring  Cardiology following - was taken off scheduled Lopressor given sinus pauses. Question if inpatient ablation may need to be considered with limited use of AV isaac agents in setting of above. Further discussion with cardiology/EP

## 2025-07-14 NOTE — CASE MANAGEMENT
"   Case Management Progress Note    Patient name Cammy Prieto  Location /-01 MRN [id number]  : 1970 Date 2025       LOS (days): 4  Geometric Mean LOS (GMLOS) (days): 3.5  Days to GMLOS:-0.5        OBJECTIVE:        Current admission status: Inpatient  Preferred Pharmacy:   [facility]/pharmacy #[id number] - [place] - [address]  [address]  [place] [id number]  Phone: [phone] Fax: [phone]    Primary Care Provider: Amisha Sung    Primary Insurance: BLUE CROSS  Secondary Insurance:     PROGRESS NOTE:  Case reviewed in [facility] rounds  ELOS- 24-48    Pt's abdominal pain has improved today. Prior reporting \"waves\" of pain, now mostly with movement. Tolerating diet. No N/V. Did not have bowel movement yet today. Denies any chest pain, SOB, palpitations       CM note- IPTA, lives with nephews,works full time, has active health insurance and a PCP, no DME, refusing STR and HHC.    Patient concerned about an ambulance bill- CM will attempt to assist with clarity.           "

## 2025-07-14 NOTE — ASSESSMENT & PLAN NOTE
Pt admitted to ICU after development of rapid a fib requiring Cardizem and close monitoring   No bowel function. WBC 11.22 from 12.4  on admission  Procalcitonin 1.31, 1.06   Phosphorus 2.2  Magnesium 1.8   Pt is [employer] and refuses blood products and albumin.   H/o multiple intracerebral aneurysms, on Brillinta.   VSS with the exception of respiratory rate 20-20. Pt with GONZALO and did not have CPAP on last night, she is not tachypneic, she is sitting comfortably in the chair, O2 sat 95%     Pt is passing gas and had 3 bowel movements, waves of gas pain that resolve quickly. She states that she has memory problems and would like us to speak to her medical proxy who is present. We did confirm all that she had related to her proxy.   She will be managed by cardiology for the a fib/ flutter rate, and she will go home with an antibiotics for the diverticulitis. She will follow up in the surgery office in 2 weeks.     Advance to low fiber diet for dinner,   Ok to transfer to [facility] for management of a fib, surgery on consult   IV resuscitation, follow endpoints  Replete electrolytes as needed   ABX :cefepime and flagyl   Hold Brillinta    High risk of complications due to antiplatelet medications, Protestant status, BMI 50  Pt will have her nephew bring in her CPAP from home

## 2025-07-14 NOTE — ASSESSMENT & PLAN NOTE
History of PAF during brain aneurysm coiling and stenting  Was not previously taking anticoagulation  Now appears to have new onset atrial flutter paroxysmally  Currently patient is in sinus with heart rate in the 70s to 80s  Patient asymptomatic  Discussed pathophysiology of atrial flutter with the patient; all questions answered  Patient will need oral rate control when able to take oral (could plan to resume her bisoprolol/HCTZ)  Patient ideally would need oral anticoagulation, [id number]=0 (female, htn) BUT would hold off at this time given current diagnosis of perforated diverticulitis, also with hx of SAH  Advised to follow-up with primary cardiologist in [facility]  Also advised she would also need to be evaluated by electrophysiology to discuss treatment options of a flutter including ablation therapy and/or Watchman   Patient had recent echocardiogram done in [place] 6/20/25: EF 63%, trivial MR, mild TR, trivial [place] (no need to repeat at this time)  TSH 4.06

## 2025-07-14 NOTE — UTILIZATION REVIEW
Continued Stay Review    Date: 7/14                          Current Patient Class: Inpatient  Current Level of Care: MS    HPI:55 y.o. female initially admitted on  7/10  Current Diagnosis: diverticulitis , Afib w/ sinus pause    Assessment/Plan:     7/14 IM Note   Abd pain has improved today . Reports waves of pain , mostly w/ movement .   Diet adv to low fiber diet . Cont cefepime and flagyl D5 . Overnight 7/13-7/14 with notable sinus pause & episodes of A flutter with RVR. 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 Brilinta remains on hold . Monitor tele . Intermittent HTN resume norvasc and valsartan . Hold ziac .     7/14 Cardiology Note   Episodes of bradycardia and sinus pauses up to 5 seconds in the setting of untreated sleep apnea, IV Lopressor  Patient currently appears asymptomatic  Continue to monitor telemetry  Discontinue IV Lopressor  Reached out to EP at [place] if patient is asymptomatic no need for PPM at this time  Advised about these new findings and how important will be to follow-up with the EP given new diagnosis of atrial flutter/bradycardia and about the potential need for a flutter ablation    Medications:   Scheduled Medications:  cefepime, 2,000 mg, Intravenous, Q8H  chlorhexidine, 15 mL, Mouth/Throat, Q12H CELE  diltiazem, 10 mg, Intravenous, Once  heparin (porcine), 7,500 Units, Subcutaneous, Q8H CELE  metoprolol, 5 mg, Intravenous, Q6H  metroNIDAZOLE, 500 mg, Intravenous, Q12H  pantoprazole, 40 mg, Intravenous, Q12H CELE  [Held by provider] ticagrelor, 90 mg, Oral, BID      Continuous IV Infusions:     PRN Meds:  acetaminophen, 1,000 mg, Intravenous, Q6H PRN  labetalol, 10 mg, Intravenous, Q4H PRN  morphine injection, 2 mg, Intravenous, Q3H PRN 7/14 x1  ondansetron, 4 mg, Intravenous, Q6H PRN      Discharge Plan: TBD     Vital Signs (last 3 days)       Date/Time Temp Pulse Resp BP MAP (mmHg) SpO2 O2  Device Patient Position - Orthostatic VS Urvashi Coma Scale Score Pain    07/14/25 08:31:10 -- 95 -- 136/75 95 95 % -- -- -- --    07/14/25 0803 -- -- -- -- -- -- -- -- 15 No Pain    07/14/25 05:08:45 -- 79 -- 164/92 116 92 % -- -- -- --    07/14/25 03:08:24 98.6 °F (37 °C) 85 20 143/81 102 93 % -- -- -- --    07/14/25 0053 -- -- -- -- -- -- -- -- -- 9    07/13/25 2300 -- -- -- -- -- -- -- -- 15 No Pain    07/13/25 22:19:35 98.1 °F (36.7 °C) 88 -- 162/95 117 94 % None (Room air) -- -- --    07/13/25 18:30:28 99.6 °F (37.6 °C) 84 20 160/92 115 95 % -- Sitting -- --    07/13/25 18:30:12 99.6 °F (37.6 °C) 84 -- 160/92 115 94 % -- -- -- --    07/13/25 16:29:02 98.1 °F (36.7 °C) 77 -- 149/89 109 94 % -- -- -- --    07/13/25 10:03:50 98.2 °F (36.8 °C) 85 -- 123/96 105 92 % -- -- -- --    07/13/25 10:03:39 98.2 °F (36.8 °C) 89 -- 123/96 105 91 % -- -- -- --    07/13/25 0800 -- -- -- -- -- -- -- -- 15 No Pain    07/13/25 07:25:10 98.5 °F (36.9 °C) 142 -- 170/108 129 93 % -- -- -- --    07/13/25 06:09:41 -- 86 20 168/90 116 91 % -- -- -- --    07/13/25 04:13:26 -- 91 -- 108/63 78 92 % -- -- -- --    07/13/25 03:36:23 98.1 °F (36.7 °C) 149 -- 175/121 139 93 % -- -- -- --    07/13/25 0332 -- -- -- -- -- -- -- -- -- 7    07/13/25 02:58:30 -- 102 -- -- -- 92 % -- -- -- --    07/13/25 02:58:20 -- 150 -- -- -- 93 % -- -- -- --    07/13/25 02:58:02 -- 147 -- -- -- 92 % -- -- -- --    07/13/25 02:57:49 -- 91 -- -- -- 94 % -- -- -- --    07/13/25 02:57:44 -- 114 -- -- -- 94 % -- -- -- --    07/13/25 02:57:37 -- 146 -- -- -- 94 % -- -- -- --    07/13/25 02:57:24 -- 139 -- -- -- 93 % -- -- -- --    07/13/25 02:36:13 -- 85 -- 136/96 109 90 % -- -- -- --    07/13/25 0200 -- 78 -- -- -- -- -- -- -- --    07/13/25 00:00:22 -- 80 -- 141/75 97 95 % -- -- -- --    07/13/25 0000 -- 80 -- 141/75 97 -- -- -- -- --    07/12/25 23:12:45 98.1 °F (36.7 °C) 148 16 157/97 117 94 % -- -- -- --    07/12/25 23:12:30 98.1 °F (36.7 °C) 149 15 157/97 117 96  % -- -- -- --    07/12/25 2200 -- 82 -- -- -- -- -- -- -- --    07/12/25 20:43:28 -- 89 -- 172/95 121 93 % -- -- -- --    07/12/25 2011 -- -- -- -- -- -- -- -- 15 No Pain    07/12/25 1825 -- -- -- -- -- -- -- -- -- 9    07/12/25 18:24:53 -- 93 -- 160/82 108 95 % -- -- -- --    07/12/25 12:03:24 97.9 °F (36.6 °C) 108 -- 176/95 122 94 % -- -- -- --    07/12/25 11:13:27 -- 149 -- 173/94 120 94 % -- -- -- --    07/12/25 0900 -- -- -- -- -- -- None (Room air) -- 15 No Pain    07/12/25 07:38:17 98.4 °F (36.9 °C) 90 -- 122/70 87 94 % -- -- -- --    07/12/25 05:00:47 98.1 °F (36.7 °C) 144 -- 165/92 116 95 % -- -- -- --    07/12/25 0351 -- 84 -- -- -- -- -- -- -- --    07/12/25 03:47:24 -- 92 -- 114/70 85 92 % -- -- -- --    07/12/25 0342 -- -- -- -- -- -- -- -- -- 8    07/12/25 02:50:56 -- 143 -- 150/94 113 92 % -- -- -- --    07/12/25 02:13:30 98.9 °F (37.2 °C) 92 -- -- -- 93 % -- -- -- --    07/12/25 02:00:34 -- 125 -- 181/104 130 94 % -- -- -- --    07/12/25 0200 -- 121 -- -- -- 95 % -- -- -- --    07/12/25 0000 -- 90 -- -- -- -- -- -- -- --    07/11/25 23:59:40 -- 90 -- 135/82 100 95 % None (Room air) -- 15 No Pain    07/11/25 2200 -- 88 -- -- -- -- -- -- -- --    07/11/25 21:17:26 98.4 °F (36.9 °C) 119 -- 152/92 112 94 % -- -- -- --    07/11/25 21:16:08 98.4 °F (36.9 °C) 136 -- 157/105 122 94 % -- -- -- --    07/11/25 21:15:25 98.4 °F (36.9 °C) 94 -- 157/105 122 90 % -- -- -- --    07/11/25 2001 98.7 °F (37.1 °C) 94 18 164/67 96 93 % None (Room air) Lying -- --    07/11/25 1826 101.8 °F (38.8 °C) 90 32 159/67 97 94 % -- Lying -- --    07/11/25 1500 -- -- -- -- -- -- -- -- 15 --    07/11/25 1451 -- -- -- -- -- -- -- -- -- 10 - Worst Possible Pain    07/11/25 1326 -- 82 28 137/78 98 95 % -- -- -- --    07/11/25 1100 99 °F (37.2 °C) 77 26 150/93 114 93 % None (Room air) Lying -- 6    07/11/25 0800 -- -- -- -- -- -- -- -- 15 --    07/11/25 0700 99.7 °F (37.6 °C) 86 27 143/71 97 93 % -- Lying -- --    07/11/25 0631 -- 85 33  139/61 88 95 % -- -- -- --    07/11/25 0630 -- 85 29 156/64 92 94 % -- -- -- --    07/11/25 0600 -- 92 30 174/76 109 95 % -- -- -- --    07/11/25 0500 -- 92 29 157/70 100 95 % -- -- -- --    07/11/25 0400 -- 90 30 133/93 105 95 % -- -- 15 --    07/11/25 0300 -- 82 25 122/57 82 94 % -- -- -- --    07/11/25 0200 -- 81 20 114/58 80 96 % -- -- -- --    07/11/25 0100 -- 79 25 127/60 87 95 % -- -- -- --    07/11/25 0000 -- 85 25 133/71 91 91 % -- -- 15 --          Weight (last 2 days)       Date/Time Weight    07/14/25 0600 140 (308.64)    07/13/25 0552 141 (310.85)    07/12/25 0600 140 (308.64)     Weight: Pt in a lot of pain and asked to do weight later at 07/12/25 0600            Pertinent Labs/Diagnostic Results:   Radiology:  CT abdomen pelvis with contrast   Final Interpretation by Tal Zavala MD (07/10 1735)      Perforated sigmoid diverticulitis.   Adjacent extraluminal free air and scattered small volume pneumoperitoneum.   2 cm adjacent loculated fluid collection anteriorly.   Reactive enterocolitis.      Prominent bilateral inguinal nodes, likely reactive.      Findings were discussed with Dr. Celestin from the emergency department at 5:30 p.m. on 7/10/2025         Workstation performed: [id number]           Cardiology:  ECG 12 lead   Final Result by Yovani Martinez MD (07/14 0819)   Normal sinus rhythm   Possible Left atrial enlargement   Borderline ECG   When compared with ECG of 12-Jul-2025 23:57,   Sinus rhythm has replaced Atrial fibrillation      Confirmed by Yovani Martinez ([id number]) on 7/14/2025 8:19:17 AM      ECG 12 lead   Final Result by Iván Butler MD (07/13 2407)   Atrial flutter with variable A-V block with premature ventricular or    aberrantly conducted complexes   When compared with ECG of 12-Jul-2025 23:56, (unconfirmed)   Atrial flutter has replaced Sinus rhythm   ST no longer depressed in Inferior leads   Confirmed by Iván Butler ([id number]) on 7/13/2025 11:37:16 AM       ECG 12 lead   Final Result by Iván Butler MD (07/13 1137)   Normal sinus rhythm   Possible Left atrial enlargement   When compared with ECG of 12-Jul-2025 08:07,   No significant change was found   Confirmed by Iván Butler ([id number]) on 7/13/2025 11:37:24 AM      ECG 12 lead   Final Result by Iván Butler MD (07/12 1110)   Normal sinus rhythm   When compared with ECG of 12-Jul-2025 04:01, (unconfirmed)   Vent. rate has decreased by  58 bpm   Normal sinus rhythm has replaced Atrial flutter with 2 to 1 block   Confirmed by Iván Butler ([id number]) on 7/12/2025 11:10:04 AM      ECG 12 lead   Final Result by Iván Butler MD (07/12 1113)   Atrial flutter with 2 to 1 block   Non-specific intra-ventricular conduction block   T wave abnormality, consider inferior ischemia   When compared with ECG of 12-Jul-2025 03:58, (unconfirmed)   No significant change was found      Confirmed by Iván Butler ([id number]) on 7/12/2025 11:13:53 AM      ECG 12 lead   Final Result by Iván Butler MD (07/12 1114)   Atrial flutter with 2:1 A-V conduction   Non-specific intra-ventricular conduction block   Nonspecific T wave abnormality   When compared with ECG of 12-Jul-2025 03:57, (unconfirmed)   No significant change was found      Confirmed by Iván Butler ([id number]) on 7/12/2025 11:14:11 AM      ECG 12 lead   Final Result by Iván Butler MD (07/12 1115)   Atrial flutter with 2:1 A-V conduction   Non-specific intra-ventricular conduction block   T wave abnormality, consider inferior ischemia   When compared with ECG of 12-Jul-2025 03:56, (unconfirmed)   No significant change was found      Confirmed by Iván Butler ([id number]) on 7/12/2025 11:15:02 AM      ECG 12 lead   Final Result by Iván Butler MD (07/12 1115)   Atrial flutter with 2:1 A-V conduction   Non-specific intra-ventricular conduction block   T wave abnormality, consider anterior ischemia   When compared with ECG of 12-Jul-2025  02:39, (unconfirmed)   Atrial flutter has replaced Sinus rhythm   Vent. rate has increased by  56 bpm      Confirmed by Iván Butler ([id number]) on 7/12/2025 11:15:27 AM      ECG 12 lead   Final Result by Iván Butler MD (07/12 1115)   Normal sinus rhythm   When compared with ECG of 11-Jul-2025 08:55,   Premature supraventricular complexes are no longer Present   Criteria for Inferior infarct are no longer Present   Confirmed by Iván Butler ([id number]) on 7/12/2025 11:15:41 AM      ECG 12 lead   Final Result by Jerri Anaya MD (07/12 0112)   Sinus rhythm with Premature supraventricular complexes   Moderate voltage criteria for LVH, may be normal variant   Inferior infarct , age undetermined   Abnormal ECG   When compared with ECG of 10-Jul-2025 16:33, (unconfirmed)   Significant changes have occurred   Confirmed by Jerri Anaya ([id number]) on 7/12/2025 1:12:06 AM      ECG 12 lead   Final Result by Jerri Anaya MD (07/12 0003)   Atrial flutter with variable A-V block   ST & T wave abnormality, consider inferior ischemia   Abnormal ECG   Confirmed by Jerri Anaya ([id number]) on 7/12/2025 12:03:06 AM      ECG 12 lead   Final Result by Jerri Anaya MD (07/11 2048)   Normal sinus rhythm   Biatrial enlargement   Abnormal ECG   No previous ECGs available   Confirmed by Jerri Anaya ([id number]) on 7/11/2025 8:48:26 PM        GI:  No orders to display           Results from last 7 days   Lab Units 07/13/25  1201 07/12/25  0507 07/11/25  0541 07/10/25  1532   WBC Thousand/uL 7.07 10.70* 11.22* 12.47*   HEMOGLOBIN g/dL 11.8 11.2* 10.7* 13.3   HEMATOCRIT % 34.9 33.4* 33.9* 40.1   PLATELETS Thousands/uL 225 178 174 236   TOTAL NEUT ABS Thousands/µL 5.63 9.03* 9.99* 11.33*         Results from last 7 days   Lab Units 07/13/25  1201 07/13/25  0018 07/12/25  0507 07/11/25  0521 07/10/25  1532   SODIUM mmol/L 134* 135 137 139 137   POTASSIUM mmol/L 3.9 3.6 3.6 3.6 3.7   CHLORIDE mmol/L 101 102 105 106 102   CO2 mmol/L  "23 24 23 26 26   ANION GAP mmol/L 10 9 9 7 9   BUN mg/dL 8 9 9 10 15   CREATININE mg/dL 0.66 0.74 0.79 0.97 1.08   EGFR ml/min/1.73sq m 101 92 85 66 58   CALCIUM mg/dL 7.8* 8.1* 8.2* 8.1* 9.7   CALCIUM, IONIZED mmol/L 0.98*  --  1.07* 1.06*  --    MAGNESIUM mg/dL 2.4 2.2 2.2 1.8*  --    PHOSPHORUS mg/dL 1.9* 2.1* 2.3* 2.2*  --      Results from last 7 days   Lab Units 07/13/25  1201 07/12/25  0507 07/11/25  0521 07/10/25  1532   AST U/L 12* 10* 9* 12*   ALT U/L 8 7 8 12   ALK PHOS U/L 42 40 38 46   TOTAL PROTEIN g/dL 6.9 6.7 6.6 8.3   ALBUMIN g/dL 3.5 3.3* 3.5 4.5   TOTAL BILIRUBIN mg/dL 0.46 0.55 0.91 1.14*         Results from last 7 days   Lab Units 07/13/25  1201 07/13/25  0018 07/12/25  0507 07/11/25  0521 07/10/25  1532   GLUCOSE RANDOM mg/dL 99 113 115 128 135             No results found for: \"BETA-HYDROXYBUTYRATE\"                           Results from last 7 days   Lab Units 07/10/25  2028   PROTIME seconds 16.0*   INR  1.21*   PTT seconds 35*     Results from last 7 days   Lab Units 07/12/25  0507   TSH 3RD GENERATON uIU/mL 4.067     Results from last 7 days   Lab Units 07/11/25  0521 07/10/25  2028   PROCALCITONIN ng/ml 1.31* 1.06*     Results from last 7 days   Lab Units 07/10/25  2028   LACTIC ACID mmol/L 0.9                                 Results from last 7 days   Lab Units 07/10/25  1532   LIPASE u/L 8*                 Results from last 7 days   Lab Units 07/10/25  1921   CLARITY UA  Clear   COLOR UA  Colorless   SPEC GRAV UA  1.033*   PH UA  6.0   GLUCOSE UA mg/dl Negative   KETONES UA mg/dl Negative   BLOOD UA  Negative   PROTEIN UA mg/dl Negative   NITRITE UA  Negative   BILIRUBIN UA  Negative   UROBILINOGEN UA (BE) mg/dl <2.0   LEUKOCYTES UA  Negative                                 Results from last 7 days   Lab Units 07/10/25  2111   BLOOD CULTURE  No Growth at 72 hrs.  No Growth at 72 hrs.                   Network Utilization Review Department  ATTENTION: Please call with any questions or " concerns to [phone] and carefully listen to the prompts so that you are directed to the right person. All voicemails are confidential.   For Discharge needs, contact Care Management DC Support Team at [phone] opt. 2  Send all requests for admission clinical reviews, approved or denied determinations and any other requests to dedicated fax number below belonging to the [facility] where the patient is receiving treatment. List of dedicated fax numbers for the Facilities:  FACILITY NAME UR FAX NUMBER   ADMISSION DENIALS (Administrative/Medical Necessity) [phone]   DISCHARGE SUPPORT TEAM (NETWORK) [phone]   PARENT CHILD HEALTH (Maternity/NICU/Pediatrics) [phone]   [facility] [phone]   [facility] [phone]   [facility] [phone]   [facility] [phone]   [facility], [facility] [phone]   [facility] [phone]   [facility] [phone]   [facility] [phone]   [facility] [phone]   [facility] [phone]   [facility] [phone]   [facility] [phone]

## 2025-07-14 NOTE — PLAN OF CARE
Problem: PAIN - ADULT  Goal: Verbalizes/displays adequate comfort level or baseline comfort level  Description: Interventions:  - Encourage patient to monitor pain and request assistance  - Assess pain using appropriate pain scale  - Administer analgesics as ordered based on type and severity of pain and evaluate response  - Implement non-pharmacological measures as appropriate and evaluate response  - Consider cultural and social influences on pain and pain management  - Notify physician/advanced practitioner if interventions unsuccessful or patient reports new pain  - Educate patient/family on pain management process including their role and importance of  reporting pain   - Provide non-pharmacologic/complimentary pain relief interventions  Outcome: Progressing     Problem: INFECTION - ADULT  Goal: Absence or prevention of progression during hospitalization  Description: INTERVENTIONS:  - Assess and monitor for signs and symptoms of infection  - Monitor lab/diagnostic results  - Monitor all insertion sites, i.e. indwelling lines, tubes, and drains  - Monitor endotracheal if appropriate and nasal secretions for changes in amount and color  - [place] appropriate cooling/warming therapies per order  - Administer medications as ordered  - Instruct and encourage patient and family to use good hand hygiene technique  - Identify and instruct in appropriate isolation precautions for identified infection/condition  Outcome: Progressing  Goal: Absence of fever/infection during neutropenic period  Description: INTERVENTIONS:  - Monitor WBC  - Perform strict hand hygiene  - Limit to healthy visitors only  - No plants, dried, fresh or silk flowers with leblanc in patient room  Outcome: Progressing     Problem: SAFETY ADULT  Goal: Patient will remain free of falls  Description: INTERVENTIONS:  - Educate patient/family on patient safety including physical limitations  - Instruct patient to call for assistance with activity   -  Consider consulting OT/PT to assist with strengthening/mobility based on AM PAC & JH-HLM score  - Consult OT/PT to assist with strengthening/mobility   - Keep Call bell within reach  - Keep bed low and locked with side rails adjusted as appropriate  - Keep care items and personal belongings within reach  - Initiate and maintain comfort rounds  - Make Fall Risk Sign visible to staff  - Apply yellow socks and bracelet for high fall risk patients  - Consider moving patient to room near nurses station  Outcome: Progressing  Goal: Maintain or return to baseline ADL function  Description: INTERVENTIONS:  -  Assess patient's ability to carry out ADLs; assess patient's baseline for ADL function and identify physical deficits which impact ability to perform ADLs (bathing, care of mouth/teeth, toileting, grooming, dressing, etc.)  - Assess/evaluate cause of self-care deficits   - Assess range of motion  - Assess patient's mobility; develop plan if impaired  - Assess patient's need for assistive devices and provide as appropriate  - Encourage maximum independence but intervene and supervise when necessary  - Involve family in performance of ADLs  - Assess for home care needs following discharge   - Consider OT consult to assist with ADL evaluation and planning for discharge  - Provide patient education as appropriate  - Monitor functional capacity and physical performance, use of AM PAC & JH-HLM   - Monitor gait, balance and fatigue with ambulation    Outcome: Progressing  Goal: Maintains/Returns to pre admission functional level  Description: INTERVENTIONS:  - Perform AM-PAC 6 Click Basic Mobility/ Daily Activity assessment daily.  - Set and communicate daily mobility goal to care team and patient/family/caregiver.   - Collaborate with rehabilitation services on mobility goals if consulted  - Out of bed for toileting  - Record patient progress and toleration of activity level   Outcome: Progressing

## 2025-07-14 NOTE — PROGRESS NOTES
Progress Note - Hospitalist   Name: Cammy Prieto 55 y.o. female I MRN: [id number]  Unit/Bed#: -01 I Date of Admission: 7/10/2025   Date of Service: 7/14/2025 I Hospital Day: 4    Assessment & Plan  Perforation of sigmoid colon due to diverticulitis  Patient initially presenting with lower abdominal pain, nausea and vomiting + fevers  CT abdomen pelvis: Perforated sigmoid diverticulitis.  Adjacent extraluminal free air and scattered small volume pneumoperitoneum.  2 cm adjacent loculated fluid collection anteriorly.  Reactive enterocolitis  Was admitted to the general surgery service, however patient admitted to ICU after development of rapid A-fib requiring Cardizem and close monitoring  Surgery opting for conservative management  Diet now advanced to low fiber diet  Continue cefepime and Flagyl D5  Paroxysmal atrial fibrillation (HCC)  Sinus pause  History of PAF during brain aneurysm coiling and stenting  Not previously on anticoagulation   Now appears to have new onset atrial flutter paroxysmally   Overnight 7/13-7/14 with notable sinus pause & episodes of A flutter with RVR. 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 Ideally should be on Eliquis. Plan to follow-up with primary cardiologist in [place] and EP follow-up   Continue to monitor on telemetry     Essential hypertension  With intermittent hypertension   Resume Norvasc/valsartan  Hold Ziac (bisoprolol/HCTZ) at this time with sinus pause overnight   MORBID (SEVERE) OBESITY DUE TO EXCESS CALORIES  Encourage weight loss  Sepsis without acute organ dysfunction (HCC)  Secondary to acute perforation  Antibiotics day 5  Leukocytosis resolved  Afebrile  Blood cultures no growth at 72  "hours  History of brain aneurysm-secured  Status post ALEXUS stent and coiling  Was previously on Brilinta/ASA, currently on hold  Refusal of blood product  Patient is a Rastafari    VTE Pharmacologic Prophylaxis:   heparin    Mobility:   Basic Mobility Inpatient Raw Score: 18  JH-HLM Goal: 6: Walk 10 steps or more  JH-HLM Achieved: 6: Walk 10 steps or more  JH-HLM Goal achieved. Continue to encourage appropriate mobility.    Patient Centered Rounds: I performed bedside rounds with nursing staff today.   Discussions with Specialists or Other Care Team Provider: case management, cardiology    Education and Discussions with Family / Patient: Updated  (friend) at bedside.    Current Length of Stay: 4 day(s)  Current Patient Status: Inpatient   Certification Statement: The patient will continue to require additional inpatient hospital stay due to acute diverticulitis with perforation, A flutter with RVR/sinus pause requiring further monitoring on telemetry and medication adjustments   Discharge Plan: Anticipate discharge in 24-48 hrs to discharge location to be determined pending rehab evaluations.    Code Status: Level 1 - Full Code    Subjective   Patient seen and examined. Notes her abdominal pain has improved today. Prior reporting \"waves\" of pain, now mostly with movement. Tolerating diet. No N/V. Did not have bowel movement yet today. Denies any chest pain, SOB, palpitations     Objective :  Temp:  [98.1 °F (36.7 °C)-99.6 °F (37.6 °C)] 98.6 °F (37 °C)  HR:  [77-95] 95  BP: (136-164)/(75-95) 136/75  Resp:  [20] 20  SpO2:  [92 %-95 %] 95 %  O2 Device: None (Room air)    Body mass index is 51.36 kg/m².     Input and Output Summary (last 24 hours):     Intake/Output Summary (Last 24 hours) at 7/14/2025 1058  Last data filed at 7/14/2025 0747  Gross per 24 hour   Intake 640 ml   Output 2225 ml   Net -1585 ml       Physical Exam  Constitutional:       General: She is not in acute distress.  HENT:      " Head: Normocephalic and atraumatic.     Cardiovascular:      Rate and Rhythm: Normal rate and regular rhythm.   Pulmonary:      Effort: Pulmonary effort is normal. No respiratory distress.      Breath sounds: Normal breath sounds.   Abdominal:      General: Abdomen is flat. There is no distension.      Palpations: Abdomen is soft.      Tenderness: There is abdominal tenderness. There is no guarding.     Skin:     General: Skin is warm and dry.     Neurological:      General: No focal deficit present.      Mental Status: She is alert and oriented to person, place, and time.           Lines/Drains:        Telemetry:  Telemetry Orders (From admission, onward)               24 Hour Telemetry Monitoring  Continuous x 24 Hours (Telem)        Expiring   Question:  Reason for 24 Hour Telemetry  Answer:  Arrhythmias requiring acute medical intervention / PPM or ICD malfunction                     Telemetry Reviewed: Normal Sinus Rhythm, Pause(s), and A flutter  Indication for Continued Telemetry Use: Arrthymias requiring medical therapy               Lab Results: I have reviewed the following results:   Results from last 7 days   Lab Units 07/13/25  1201   WBC Thousand/uL 7.07   HEMOGLOBIN g/dL 11.8   HEMATOCRIT % 34.9   PLATELETS Thousands/uL 225   SEGS PCT % 79*   LYMPHO PCT % 11*   MONO PCT % 7   EOS PCT % 2     Results from last 7 days   Lab Units 07/13/25  1201   SODIUM mmol/L 134*   POTASSIUM mmol/L 3.9   CHLORIDE mmol/L 101   CO2 mmol/L 23   BUN mg/dL 8   CREATININE mg/dL 0.66   ANION GAP mmol/L 10   CALCIUM mg/dL 7.8*   ALBUMIN g/dL 3.5   TOTAL BILIRUBIN mg/dL 0.46   ALK PHOS U/L 42   ALT U/L 8   AST U/L 12*   GLUCOSE RANDOM mg/dL 99     Results from last 7 days   Lab Units 07/10/25  2028   INR  1.21*             Results from last 7 days   Lab Units 07/11/25  0521 07/10/25  2028   LACTIC ACID mmol/L  --  0.9   PROCALCITONIN ng/ml 1.31* 1.06*       Recent Cultures (last 7 days):   Results from last 7 days   Lab Units  07/10/25  2113   BLOOD CULTURE  No Growth at 72 hrs.  No Growth at 72 hrs.       Imaging Results Review: I reviewed radiology reports from this admission including: CT abdomen/pelvis.      Last 24 Hours Medication List:     Current Facility-Administered Medications:     acetaminophen (Ofirmev) injection 1,000 mg, Q6H PRN, Last Rate: 1,000 mg (07/14/25 0509)    cefepime (MAXIPIME) 2 g/50 mL dextrose IVPB, Q8H, Last Rate: 2,000 mg (07/14/25 0800)    chlorhexidine (PERIDEX) 0.12 % oral rinse 15 mL, Q12H CELE    diltiazem (CARDIZEM) injection 10 mg, Once    heparin (porcine) subcutaneous injection 7,500 Units, Q8H CELE    labetalol (NORMODYNE) injection 10 mg, Q4H PRN    metoprolol (LOPRESSOR) injection 5 mg, Q6H    metroNIDAZOLE (FLAGYL) IVPB (premix) 500 mg 100 mL, Q12H, Last Rate: 500 mg (07/14/25 0509)    morphine injection 2 mg, Q3H PRN    ondansetron (ZOFRAN) injection 4 mg, Q6H PRN    pantoprazole (PROTONIX) injection 40 mg, Q12H CELE    [Held by provider] ticagrelor (BRILINTA) tablet 90 mg, BID    Administrative Statements   Today, Patient Was Seen By: Ashley Allison PA-C      **Please Note: This note may have been constructed using a voice recognition system.**

## 2025-07-14 NOTE — PROGRESS NOTES
Progress Note - Surgery-General   Name: Cammy Prieto 55 y.o. female I MRN: [id number]  Unit/Bed#: -01 I Date of Admission: 7/10/2025   Date of Service: 7/14/2025 I Hospital Day: 4    Assessment & Plan  Perforation of sigmoid colon due to diverticulitis    Pt admitted to ICU after development of rapid a fib requiring Cardizem and close monitoring   No bowel function. WBC 11.22 from 12.4  on admission  Procalcitonin 1.31, 1.06   Phosphorus 2.2  Magnesium 1.8   Pt is Mormon and refuses blood products and albumin.   H/o multiple intracerebral aneurysms, on Brillinta.   VSS with the exception of respiratory rate 20-20. Pt with GONZALO and did not have CPAP on last night, she is not tachypneic, she is sitting comfortably in the chair, O2 sat 95%     Pt is passing gas and had 3 bowel movements, waves of gas pain that resolve quickly. She states that she has memory problems and would like us to speak to her medical proxy who is present. We did confirm all that she had related to her proxy.   She will be managed by cardiology for the a fib/ flutter rate, and she will go home with an antibiotics for the diverticulitis. She will follow up in the surgery office in 2 weeks.     Advance to low fiber diet for dinner,   Ok to transfer to [facility] for management of a fib, surgery on consult   IV resuscitation, follow endpoints  Replete electrolytes as needed   ABX :cefepime and flagyl   Hold Brillinta    High risk of complications due to antiplatelet medications, Religion status, BMI 50  Pt will have her nephew bring in her CPAP from home    Essential hypertension  Per SLIM   Paroxysmal atrial fibrillation (HCC)  Per SLIM   MORBID (SEVERE) OBESITY DUE TO EXCESS CALORIES  Per SLIM   Sepsis without acute organ dysfunction (HCC)  Per SLIM   History of brain aneurysm-secured  Per SLIM   Refusal of blood product  Per SLIM         Subjective   Please talk to my health proxy about all that is going on,   I am passing  gas, and tolerating the diet  My heart rate is sometimes very fast but it is a little better.    Objective :  Temp:  [98.1 °F (36.7 °C)-99.6 °F (37.6 °C)] 98.6 °F (37 °C)  HR:  [77-95] 95  BP: (136-164)/(75-95) 136/75  Resp:  [20] 20  SpO2:  [92 %-95 %] 95 %  O2 Device: None (Room air)    I/O         07/12 0701 07/13 0700 07/13 0701 07/14 0700 07/14 0701  07/15 0700    P.O.  640     I.V. (mL/kg)       IV Piggyback       Total Intake(mL/kg)  640 (4.6)     Urine (mL/kg/hr) 3425 (1) 2325 (0.7) 425 (0.9)    Stool 0 0     Total Output 3425 2325 425    Net -3425 -1685 -425           Unmeasured Urine Occurrence  3 x     Unmeasured Stool Occurrence 1 x 2 x             Physical Exam  Constitutional:       Appearance: She is obese.     Cardiovascular:      Comments: Apical rate 85,   Pulmonary:      Effort: Pulmonary effort is normal.      Breath sounds: Normal breath sounds.   Abdominal:      Comments: Obese, minimal tenderness in low midline and LLQ,   NBS mildly distended,        Musculoskeletal:         General: No tenderness.     Skin:     General: Skin is warm and dry.     Neurological:      General: No focal deficit present.      Mental Status: She is alert and oriented to person, place, and time.     Psychiatric:         Mood and Affect: Mood normal.         Behavior: Behavior normal.           Lab Results: I have reviewed the following results:  Recent Labs     07/13/25  1201   WBC 7.07   HGB 11.8   HCT 34.9      SODIUM 134*   K 3.9      CO2 23   BUN 8   CREATININE 0.66   GLUC 99   CAIONIZED 0.98*   MG 2.4   PHOS 1.9*   AST 12*   ALT 8   ALB 3.5   TBILI 0.46   ALKPHOS 42       Ese HUNTLEY

## 2025-07-14 NOTE — DISCHARGE INSTR - AVS FIRST PAGE
Diverticulitis Discharge Instructions  You have been prescribed antibiotics. Complete the full course of antibiotics prescribed to you.  You will need to be on a diverticulitis diet (low residual/low fiber) for at least 2 weeks, then you can add higher fiber foods to transition to a diverticulosis diet (high fiber)  You will need to follow up with Gastroenterology (GI) for a colonoscopy in 6-8 weeks. You should not have a colonoscopy with an active diverticulitis infection. This will be discussed at your follow up appointment with general surgery.      Diverticulitis Diet (low residual/low fiber)    WHAT YOU NEED TO KNOW:   A diverticulitis diet includes foods that allow your intestines to rest while you have diverticulitis. Diverticulitis is a condition that causes small pockets along your intestine called diverticula to become inflamed or infected. This is caused by hard bowel movement, food, or bacteria that get stuck in the pockets.  DISCHARGE INSTRUCTIONS:   Foods you can eat while you have diverticulitis:   A clear liquid diet may be recommended for 2 to 3 days. A clear liquid diet is made up of clear liquids and foods that are liquid at room temperature. Your healthcare provider will tell you when you can start eating solid foods. Examples of clear liquids include the following:      Water and clear juices (such as apple, cranberry, or grape), strained citrus juices or fruit punch     Coffee or tea (without cream or milk)     Clear sports drinks or soft drinks, such as ginger ale, lemon-lime soda, or club soda (no cola or root beer)     Clear broth, bouillon, or consommé     Plain popsicles (no popsicles with pureed fruit or fiber)     Flavored gelatin without fruit     A low-fiber diet may be recommended until your symptoms improve. Your healthcare provider will tell you when you can slowly add high-fiber foods back into your diet.  Cream of wheat and finely ground grits  White bread, white pasta, and white  rice  Canned and well-cooked fruit without skins or seeds, and juice without pulp  Canned and well-cooked vegetables without skins or seeds, and vegetable juice  Cow's milk, lactose-free milk, soy milk, and rice milk  Yogurt, cottage cheese, and sherbet  Eggs, poultry (such as chicken and turkey), fish, and tender, ground, well-cooked beef  Tofu and smooth nut butters, such as peanut butter  Broth and strained soups made of low-fiber foods  Foods you should avoid while you have diverticulitis: Avoid foods that are high in fiber while you have symptoms of diverticulitis. Examples of high-fiber foods include the following:  Whole grains and breads, and cereals made with whole grains  Dried fruit, fresh fruit with skin, and fruit pulp  Raw vegetables  Cooked greens, such as spinach  Tough meat and meat with gristle  Legumes, such as isbell beans and lentils  Contact your healthcare provider if:   Your symptoms do not get better, or they get worse.   You have questions about the foods you should eat.  You have questions or concerns about your condition or care.          Diverticulosis Diet (High fiber)    WHAT YOU NEED TO KNOW:   What is a diverticulosis diet? A diverticulosis diet includes high-fiber foods. High-fiber foods help you have regular bowel movements. Extra fiber may decrease your risk of forming new diverticula (small pockets) in your intestine. A high-fiber diet may also help prevent diverticulitis. Diverticulitis is a painful condition that occurs when diverticula become inflamed or infected. You do not need to avoid nuts, seeds, corn, or popcorn while you are on a diverticulosis diet.  How much fiber do I need? You may need 25 to 35 grams of fiber each day. Ask your dietitian or healthcare provider how much fiber you should have. Increase your intake of fiber slowly. When you eat more fiber, you may have gas and feel bloated. You may need to take a fiber supplement if you do not get enough fiber from  food. Drink plenty of liquids as you increase the fiber in your diet. Your dietitian or healthcare provider may recommend 8 eight-ounce cups or more each day. Ask which liquids are best for you.   Which foods are high in fiber?   Foods with at least 4 grams of fiber per serving:      ? to ½ cup of high-fiber cereal (check the nutrition label on the box)     ½ cup of blackberries or raspberries     4 dried prunes     1 cooked artichoke     ½ cup of cooked legumes, such as lentils, or red, kidney, and isbell beans     Foods with 1 to 3 grams of fiber per servin slice of whole-wheat, pumpernickel, or rye bread     4 whole-wheat crackers     ½ cup of cereal with 1 to 3 grams of fiber per serving (check the nutrition label on the box)     1 piece of fruit, such as an apple, banana, pear, kiwi, or orange     3 dates     ½ cup of canned apricots, fruit cocktail, peaches, or pears     ½ cup of raw or cooked vegetables, such as carrots, cauliflower, cabbage, spinach, squash, or corn  When should I contact my healthcare provider?   You have questions about a high-fiber diet.     You have a change in your bowel movements.     You have an upset stomach.      You have a fever.     You have pain in your lower abdomen on the left side.     You have questions about your condition or care.    CARE AGREEMENT:   You have the right to help plan your care. Learn about your health condition and how it may be treated. Discuss treatment options with your caregivers to decide what care you want to receive. You always have the right to refuse treatment. The above information is an  only. It is not intended as medical advice for individual conditions or treatments. Talk to your doctor, nurse or pharmacist before following any medical regimen to see if it is safe and effective for you.              Diverticulitis   WHAT YOU NEED TO KNOW:   Diverticulitis is a condition that causes small pockets along your intestine  called diverticula to become inflamed or infected. This is caused by hard bowel movements, food, or bacteria that get stuck in the pockets.        DISCHARGE INSTRUCTIONS:   Seek care immediately if:   You have bowel movement or foul-smelling discharge leaking from your vagina or in your urine.     You have severe diarrhea.     You urinate less than usual or not at all.     You are not able to have a bowel movement.     You cannot stop vomiting.      You have severe abdominal pain, a fever, and your abdomen is larger than usual.      You have new or increased blood in your bowel movements.  Contact your healthcare provider if:   You have pain when you urinate.     Your symptoms get worse or do not go away.      You have questions or concerns about your condition or care.  Medicines:   Antibiotics may be given to help prevent or treat a bacterial infection.     Prescription pain medicine may be given. Ask your healthcare provider how to take this medicine safely. Some prescription pain medicines contain acetaminophen. Do not take other medicines that contain acetaminophen without talking to your healthcare provider. Too much acetaminophen may cause liver damage. Prescription pain medicine may cause constipation. Ask your healthcare provider how to prevent or treat constipation.      Take your medicine as directed. Contact your healthcare provider if you think your medicine is not helping or if you have side effects. Tell him or her if you are allergic to any medicine. Keep a list of the medicines, vitamins, and herbs you take. Include the amounts, and when and why you take them. Bring the list or the pill bottles to follow-up visits. Carry your medicine list with you in case of an emergency.  Clear liquid diet: A clear liquid diet includes any liquids that you can see through. Examples include water, ginger-gabriel, cranberry or apple juice, frozen fruit ice, or broth. Stay on a clear liquid diet until your symptoms are  gone, or as directed.  Follow up with your healthcare provider as directed: You may need to return for a colonoscopy. When your symptoms are gone, you may need a low-fat, high-fiber diet to help prevent diverticulitis from developing again. Your healthcare provider or dietitian can help you create meal plans. Write down your questions so you remember to ask them during your visits.   © 2017 [employer] Information is for End User's use only and may not be sold, redistributed or otherwise used for commercial purposes. All illustrations and images included in CareNotes® are the copyrighted property of [employer] or [employer].  The above information is an  only. It is not intended as medical advice for individual conditions or treatments. Talk to your doctor, nurse or pharmacist before following any medical regimen to see if it is safe and effective for you.

## 2025-07-14 NOTE — ASSESSMENT & PLAN NOTE
With intermittent hypertension   Resume Norvasc/valsartan  Hold Ziac (bisoprolol/HCTZ) at this time with sinus pause overnight

## 2025-07-15 VITALS
RESPIRATION RATE: 16 BRPM | HEIGHT: 65 IN | BODY MASS INDEX: 48.82 KG/M2 | SYSTOLIC BLOOD PRESSURE: 118 MMHG | WEIGHT: 293 LBS | OXYGEN SATURATION: 94 % | TEMPERATURE: 98.1 F | DIASTOLIC BLOOD PRESSURE: 77 MMHG | HEART RATE: 93 BPM

## 2025-07-15 LAB
ALBUMIN SERPL BCG-MCNC: 3.6 G/DL (ref 3.5–5)
ALP SERPL-CCNC: 40 U/L (ref 34–104)
ALT SERPL W P-5'-P-CCNC: 11 U/L (ref 7–52)
ANION GAP SERPL CALCULATED.3IONS-SCNC: 7 MMOL/L (ref 4–13)
AST SERPL W P-5'-P-CCNC: 27 U/L (ref 13–39)
ATRIAL RATE: 92 BPM
BASOPHILS # BLD MANUAL: 0 THOUSAND/UL (ref 0–0.1)
BASOPHILS NFR MAR MANUAL: 0 % (ref 0–1)
BILIRUB SERPL-MCNC: 0.44 MG/DL (ref 0.2–1)
BUN SERPL-MCNC: 9 MG/DL (ref 5–25)
CALCIUM SERPL-MCNC: 8.6 MG/DL (ref 8.4–10.2)
CHLORIDE SERPL-SCNC: 103 MMOL/L (ref 96–108)
CO2 SERPL-SCNC: 27 MMOL/L (ref 21–32)
CREAT SERPL-MCNC: 0.71 MG/DL (ref 0.6–1.3)
EOSINOPHIL # BLD MANUAL: 0.18 THOUSAND/UL (ref 0–0.4)
EOSINOPHIL NFR BLD MANUAL: 2 % (ref 0–6)
ERYTHROCYTE [DISTWIDTH] IN BLOOD BY AUTOMATED COUNT: 13.7 % (ref 11.6–15.1)
GFR SERPL CREATININE-BSD FRML MDRD: 97 ML/MIN/1.73SQ M
GLUCOSE SERPL-MCNC: 100 MG/DL (ref 65–140)
HCT VFR BLD AUTO: 36.3 % (ref 34.8–46.1)
HGB BLD-MCNC: 11.5 G/DL (ref 11.5–15.4)
LG PLATELETS BLD QL SMEAR: PRESENT
LYMPHOCYTES # BLD AUTO: 2.14 THOUSAND/UL (ref 0.6–4.47)
LYMPHOCYTES # BLD AUTO: 21 % (ref 14–44)
MCH RBC QN AUTO: 30.5 PG (ref 26.8–34.3)
MCHC RBC AUTO-ENTMCNC: 31.7 G/DL (ref 31.4–37.4)
MCV RBC AUTO: 96 FL (ref 82–98)
MONOCYTES # BLD AUTO: 0.53 THOUSAND/UL (ref 0–1.22)
MONOCYTES NFR BLD: 6 % (ref 4–12)
MYELOCYTE ABSOLUTE CT: 0.09 THOUSAND/UL (ref 0–0.1)
MYELOCYTES NFR BLD MANUAL: 1 % (ref 0–1)
NEUTROPHILS # BLD MANUAL: 5.97 THOUSAND/UL (ref 1.85–7.62)
NEUTS BAND NFR BLD MANUAL: 1 % (ref 0–8)
NEUTS SEG NFR BLD AUTO: 66 % (ref 43–75)
P AXIS: 63 DEGREES
PLATELET # BLD AUTO: 248 THOUSANDS/UL (ref 149–390)
PLATELET BLD QL SMEAR: ADEQUATE
PMV BLD AUTO: 10.9 FL (ref 8.9–12.7)
POLYCHROMASIA BLD QL SMEAR: PRESENT
POTASSIUM SERPL-SCNC: 3.7 MMOL/L (ref 3.5–5.3)
PR INTERVAL: 128 MS
PROT SERPL-MCNC: 7.2 G/DL (ref 6.4–8.4)
QRS AXIS: -1 DEGREES
QRSD INTERVAL: 72 MS
QT INTERVAL: 374 MS
QTC INTERVAL: 463 MS
RBC # BLD AUTO: 3.77 MILLION/UL (ref 3.81–5.12)
RBC MORPH BLD: PRESENT
SODIUM SERPL-SCNC: 137 MMOL/L (ref 135–147)
T WAVE AXIS: 30 DEGREES
VARIANT LYMPHS # BLD AUTO: 3 %
VENTRICULAR RATE: 92 BPM
WBC # BLD AUTO: 8.91 THOUSAND/UL (ref 4.31–10.16)

## 2025-07-15 PROCEDURE — 85007 BL SMEAR W/DIFF WBC COUNT: CPT

## 2025-07-15 PROCEDURE — 85027 COMPLETE CBC AUTOMATED: CPT

## 2025-07-15 PROCEDURE — 93010 ELECTROCARDIOGRAM REPORT: CPT | Performed by: INTERNAL MEDICINE

## 2025-07-15 PROCEDURE — 99239 HOSP IP/OBS DSCHRG MGMT >30: CPT | Performed by: NURSE PRACTITIONER

## 2025-07-15 PROCEDURE — 80053 COMPREHEN METABOLIC PANEL: CPT

## 2025-07-15 RX ORDER — DIPHENHYDRAMINE HCL 25 MG
25 TABLET ORAL ONCE
Status: COMPLETED | OUTPATIENT
Start: 2025-07-15 | End: 2025-07-15

## 2025-07-15 RX ORDER — HYDROCHLOROTHIAZIDE 12.5 MG/1
6.25 TABLET ORAL DAILY
Qty: 15 TABLET | Refills: 0 | Status: SHIPPED | OUTPATIENT
Start: 2025-07-15

## 2025-07-15 RX ORDER — PANTOPRAZOLE SODIUM 40 MG/1
40 TABLET, DELAYED RELEASE ORAL DAILY
Qty: 30 TABLET | Refills: 0 | Status: SHIPPED | OUTPATIENT
Start: 2025-07-15

## 2025-07-15 RX ORDER — TICAGRELOR 90 MG/1
90 TABLET, FILM COATED ORAL 2 TIMES DAILY
Start: 2025-07-15

## 2025-07-15 RX ADMIN — DIPHENHYDRAMINE HYDROCHLORIDE 25 MG: 25 TABLET ORAL at 04:29

## 2025-07-15 RX ADMIN — METRONIDAZOLE 500 MG: 500 INJECTION, SOLUTION INTRAVENOUS at 04:56

## 2025-07-15 RX ADMIN — PANTOPRAZOLE SODIUM 40 MG: 40 INJECTION, POWDER, FOR SOLUTION INTRAVENOUS at 09:17

## 2025-07-15 RX ADMIN — CHLORHEXIDINE GLUCONATE 15 ML: 1.2 SOLUTION ORAL at 09:32

## 2025-07-15 RX ADMIN — HEPARIN SODIUM 7500 UNITS: 5000 INJECTION INTRAVENOUS; SUBCUTANEOUS at 04:54

## 2025-07-15 RX ADMIN — CEFEPIME 2000 MG: 2 INJECTION, POWDER, FOR SOLUTION INTRAVENOUS at 09:00

## 2025-07-15 RX ADMIN — ACETAMINOPHEN 1000 MG: 10 INJECTION INTRAVENOUS at 04:16

## 2025-07-15 RX ADMIN — LOSARTAN POTASSIUM 100 MG: 50 TABLET, FILM COATED ORAL at 09:32

## 2025-07-15 RX ADMIN — AMLODIPINE BESYLATE 5 MG: 5 TABLET ORAL at 09:32

## 2025-07-16 LAB
BACTERIA BLD CULT: NORMAL
BACTERIA BLD CULT: NORMAL

## 2025-07-16 NOTE — UTILIZATION REVIEW
NOTIFICATION OF ADMISSION DISCHARGE   This is a Notification of Discharge from [facility]. Please be advised that this patient has been discharge from our facility. Below you will find the admission and discharge date and time including the patient’s disposition.   UTILIZATION REVIEW CONTACT:  Utilization Review Assistants  Network Utilization Review Department  Phone: [phone] x carefully listen to the prompts. All voicemails are confidential.  Email: [email or web address]     ADMISSION INFORMATION  PRESENTATION DATE: 7/10/2025  2:58 PM  OBERVATION ADMISSION DATE: N/A  INPATIENT ADMISSION DATE: 7/10/25  6:33 PM   DISCHARGE DATE: 7/15/2025  6:12 PM   DISPOSITION:Home/Self Care    Network Utilization Review Department  ATTENTION: Please call with any questions or concerns to [phone] and carefully listen to the prompts so that you are directed to the right person. All voicemails are confidential.   For Discharge needs, contact Care Management DC Support Team at [phone] opt. 2  Send all requests for admission clinical reviews, approved or denied determinations and any other requests to dedicated fax number below belonging to the campus where the patient is receiving treatment. List of dedicated fax numbers for the Facilities:  FACILITY NAME UR FAX NUMBER   ADMISSION DENIALS (Administrative/Medical Necessity) [phone]   DISCHARGE SUPPORT TEAM ([facility]) [phone]   PARENT CHILD HEALTH (Maternity/NICU/Pediatrics) [phone]   [facility] [phone]   [facility] [phone]   [facility] [phone]   [facility] [phone]   [facility] [phone]   [facility] [phone]   [facility] [phone]   [facility] [phone]   [facility]  [facility] [phone]   [facility] [phone]   [facility] [phone]   [facility] [phone]

## 2025-07-20 LAB
ATRIAL RATE: 80 BPM
P AXIS: 54 DEGREES
PR INTERVAL: 122 MS
QRS AXIS: 14 DEGREES
QRSD INTERVAL: 74 MS
QT INTERVAL: 394 MS
QTC INTERVAL: 455 MS
T WAVE AXIS: 48 DEGREES
VENTRICULAR RATE: 80 BPM

## 2025-07-20 PROCEDURE — 93010 ELECTROCARDIOGRAM REPORT: CPT | Performed by: INTERNAL MEDICINE
